# Patient Record
Sex: FEMALE | Race: WHITE | NOT HISPANIC OR LATINO | Employment: UNEMPLOYED | URBAN - METROPOLITAN AREA
[De-identification: names, ages, dates, MRNs, and addresses within clinical notes are randomized per-mention and may not be internally consistent; named-entity substitution may affect disease eponyms.]

---

## 2018-02-08 ENCOUNTER — OFFICE VISIT (OUTPATIENT)
Dept: FAMILY MEDICINE CLINIC | Facility: CLINIC | Age: 43
End: 2018-02-08

## 2018-02-08 VITALS
HEART RATE: 79 BPM | WEIGHT: 177.9 LBS | BODY MASS INDEX: 29.64 KG/M2 | DIASTOLIC BLOOD PRESSURE: 68 MMHG | SYSTOLIC BLOOD PRESSURE: 110 MMHG | TEMPERATURE: 98.2 F | OXYGEN SATURATION: 97 % | RESPIRATION RATE: 18 BRPM | HEIGHT: 65 IN

## 2018-02-08 DIAGNOSIS — Z00.00 WELL ADULT EXAM: Primary | ICD-10-CM

## 2018-02-08 PROCEDURE — 99396 PREV VISIT EST AGE 40-64: CPT | Performed by: INTERNAL MEDICINE

## 2018-02-08 RX ORDER — NORETHINDRONE ACETATE AND ETHINYL ESTRADIOL, ETHINYL ESTRADIOL AND FERROUS FUMARATE 1MG-10(24)
KIT ORAL
COMMUNITY
Start: 2018-01-07 | End: 2018-04-11

## 2018-02-08 NOTE — PROGRESS NOTES
Subjective   Sherri Espinoza is a 42 y.o. female who comes in today for   Chief Complaint   Patient presents with   • Annual Exam     NO PAP    .    History of Present Illness   Here for CPE  Without pap smear.  Sees gyn yearly and last visit was oct 2017.    The following portions of the patient's history were reviewed and updated as appropriate: allergies, current medications, past family history, past medical history, past social history, past surgical history and problem list.    Review of Systems   Constitutional: Negative.    HENT: Negative.    Respiratory: Negative.    Cardiovascular: Negative.    Gastrointestinal: Negative.    Genitourinary:        Has had break through bleeding with different birth control but have been more regular with the lo loestrin   Musculoskeletal: Positive for arthralgias (left knee pain off and on with certain activities.  no swelling or redness.  no limitations in movement or activites).   Neurological: Negative.    Psychiatric/Behavioral: Negative.        Vitals:    02/08/18 1110   BP: 110/68   Pulse: 79   Resp: 18   Temp: 98.2 °F (36.8 °C)   SpO2: 97%       Objective   Physical Exam   Constitutional: She is oriented to person, place, and time. She appears well-developed and well-nourished.   HENT:   Head: Normocephalic and atraumatic.   Right Ear: External ear normal.   Left Ear: External ear normal.   Mouth/Throat: Oropharynx is clear and moist.   Eyes: Conjunctivae and EOM are normal. Pupils are equal, round, and reactive to light.   Neck: Neck supple. No thyromegaly present.   Cardiovascular: Normal rate, regular rhythm and normal heart sounds.    Pulmonary/Chest: Effort normal and breath sounds normal.   Abdominal: Soft. Bowel sounds are normal. She exhibits no distension and no mass. There is no tenderness.   Lymphadenopathy:     She has no cervical adenopathy.   Neurological: She is alert and oriented to person, place, and time.   Skin: Skin is warm.   Psychiatric: She has  a normal mood and affect. Her behavior is normal. Judgment and thought content normal.   Nursing note and vitals reviewed.      Assessment/Plan   Sherri was seen today for annual exam.    Diagnoses and all orders for this visit:    Well adult exam  -     CBC & Differential  -     Comprehensive Metabolic Panel  -     Lipid Panel With LDL / HDL Ratio  -     T3, Free  -     T4, Free  -     TSH  -     Urinalysis With / Culture If Indicated - Urine, Clean Catch  -     Vitamin D 25 Hydroxy    we discussed quad strengthening exercises and knee preservation   Labs today  All HM is UTD  ROS neg  CXR and EKG were normal in 2016 and pt chose not to repeat today               I have asked for the patient to return to clinic in 12month(s).

## 2018-02-10 LAB
25(OH)D3+25(OH)D2 SERPL-MCNC: 37.7 NG/ML (ref 30–100)
ALBUMIN SERPL-MCNC: 4.2 G/DL (ref 3.5–5.2)
ALBUMIN/GLOB SERPL: 2.2 G/DL
ALP SERPL-CCNC: 41 U/L (ref 39–117)
ALT SERPL-CCNC: 15 U/L (ref 1–33)
APPEARANCE UR: CLEAR
AST SERPL-CCNC: 15 U/L (ref 1–32)
BACTERIA #/AREA URNS HPF: NORMAL /HPF
BASOPHILS # BLD AUTO: 0.01 10*3/MM3 (ref 0–0.2)
BASOPHILS NFR BLD AUTO: 0.1 % (ref 0–1.5)
BILIRUB SERPL-MCNC: 0.3 MG/DL (ref 0.1–1.2)
BILIRUB UR QL STRIP: NEGATIVE
BUN SERPL-MCNC: 12 MG/DL (ref 6–20)
BUN/CREAT SERPL: 13.2 (ref 7–25)
CALCIUM SERPL-MCNC: 9.2 MG/DL (ref 8.6–10.5)
CHLORIDE SERPL-SCNC: 103 MMOL/L (ref 98–107)
CHOLEST SERPL-MCNC: 158 MG/DL (ref 0–200)
CO2 SERPL-SCNC: 23.4 MMOL/L (ref 22–29)
COLOR UR: YELLOW
CREAT SERPL-MCNC: 0.91 MG/DL (ref 0.57–1)
EOSINOPHIL # BLD AUTO: 0.16 10*3/MM3 (ref 0–0.7)
EOSINOPHIL NFR BLD AUTO: 2.4 % (ref 0.3–6.2)
EPI CELLS #/AREA URNS HPF: NORMAL /HPF
ERYTHROCYTE [DISTWIDTH] IN BLOOD BY AUTOMATED COUNT: 13.6 % (ref 11.7–13)
GFR SERPLBLD CREATININE-BSD FMLA CKD-EPI: 67 ML/MIN/1.73
GFR SERPLBLD CREATININE-BSD FMLA CKD-EPI: 82 ML/MIN/1.73
GLOBULIN SER CALC-MCNC: 1.9 GM/DL
GLUCOSE SERPL-MCNC: 75 MG/DL (ref 65–99)
GLUCOSE UR QL: NEGATIVE
HCT VFR BLD AUTO: 40.8 % (ref 35.6–45.5)
HDLC SERPL-MCNC: 52 MG/DL (ref 40–60)
HGB BLD-MCNC: 13.4 G/DL (ref 11.9–15.5)
HGB UR QL STRIP: NEGATIVE
IMM GRANULOCYTES # BLD: 0 10*3/MM3 (ref 0–0.03)
IMM GRANULOCYTES NFR BLD: 0 % (ref 0–0.5)
KETONES UR QL STRIP: NEGATIVE
LDLC SERPL CALC-MCNC: 81 MG/DL (ref 0–100)
LDLC/HDLC SERPL: 1.55 {RATIO}
LEUKOCYTE ESTERASE UR QL STRIP: NEGATIVE
LYMPHOCYTES # BLD AUTO: 1.65 10*3/MM3 (ref 0.9–4.8)
LYMPHOCYTES NFR BLD AUTO: 24.5 % (ref 19.6–45.3)
MCH RBC QN AUTO: 31.3 PG (ref 26.9–32)
MCHC RBC AUTO-ENTMCNC: 32.8 G/DL (ref 32.4–36.3)
MCV RBC AUTO: 95.3 FL (ref 80.5–98.2)
MICRO URNS: NORMAL
MICRO URNS: NORMAL
MONOCYTES # BLD AUTO: 0.43 10*3/MM3 (ref 0.2–1.2)
MONOCYTES NFR BLD AUTO: 6.4 % (ref 5–12)
MUCOUS THREADS URNS QL MICRO: PRESENT /HPF
NEUTROPHILS # BLD AUTO: 4.49 10*3/MM3 (ref 1.9–8.1)
NEUTROPHILS NFR BLD AUTO: 66.6 % (ref 42.7–76)
NITRITE UR QL STRIP: NEGATIVE
PH UR STRIP: 5 [PH] (ref 5–7.5)
PLATELET # BLD AUTO: 205 10*3/MM3 (ref 140–500)
POTASSIUM SERPL-SCNC: 4.1 MMOL/L (ref 3.5–5.2)
PROT SERPL-MCNC: 6.1 G/DL (ref 6–8.5)
PROT UR QL STRIP: NEGATIVE
RBC # BLD AUTO: 4.28 10*6/MM3 (ref 3.9–5.2)
RBC #/AREA URNS HPF: NORMAL /HPF
SODIUM SERPL-SCNC: 139 MMOL/L (ref 136–145)
SP GR UR: 1.02 (ref 1–1.03)
T3FREE SERPL-MCNC: 3.3 PG/ML (ref 2–4.4)
T4 FREE SERPL-MCNC: 0.96 NG/DL (ref 0.93–1.7)
TRIGL SERPL-MCNC: 126 MG/DL (ref 0–150)
TSH SERPL DL<=0.005 MIU/L-ACNC: 1.35 MIU/ML (ref 0.27–4.2)
URINALYSIS REFLEX: NORMAL
UROBILINOGEN UR STRIP-MCNC: 0.2 MG/DL (ref 0.2–1)
VLDLC SERPL CALC-MCNC: 25.2 MG/DL (ref 5–40)
WBC # BLD AUTO: 6.74 10*3/MM3 (ref 4.5–10.7)
WBC #/AREA URNS HPF: NORMAL /HPF

## 2018-03-08 RX ORDER — ALBUTEROL SULFATE 90 UG/1
2 AEROSOL, METERED RESPIRATORY (INHALATION) EVERY 4 HOURS PRN
Qty: 18 G | Refills: 0 | Status: SHIPPED | OUTPATIENT
Start: 2018-03-08 | End: 2018-04-11

## 2018-03-22 ENCOUNTER — OFFICE VISIT (OUTPATIENT)
Dept: FAMILY MEDICINE CLINIC | Facility: CLINIC | Age: 43
End: 2018-03-22

## 2018-03-22 VITALS
HEIGHT: 65 IN | DIASTOLIC BLOOD PRESSURE: 66 MMHG | WEIGHT: 177.4 LBS | TEMPERATURE: 98.2 F | RESPIRATION RATE: 18 BRPM | SYSTOLIC BLOOD PRESSURE: 112 MMHG | BODY MASS INDEX: 29.56 KG/M2 | OXYGEN SATURATION: 99 % | HEART RATE: 77 BPM

## 2018-03-22 DIAGNOSIS — R10.9 ABDOMINAL PAIN, UNSPECIFIED ABDOMINAL LOCATION: Primary | ICD-10-CM

## 2018-03-22 DIAGNOSIS — R06.00 DYSPNEA, UNSPECIFIED TYPE: ICD-10-CM

## 2018-03-22 LAB
B-HCG UR QL: NEGATIVE
BILIRUB BLD-MCNC: NEGATIVE MG/DL
CLARITY, POC: CLEAR
COLOR UR: YELLOW
GLUCOSE UR STRIP-MCNC: NEGATIVE MG/DL
INTERNAL NEGATIVE CONTROL: NEGATIVE
INTERNAL POSITIVE CONTROL: POSITIVE
KETONES UR QL: NEGATIVE
LEUKOCYTE EST, POC: ABNORMAL
Lab: NORMAL
NITRITE UR-MCNC: NEGATIVE MG/ML
PH UR: 6 [PH] (ref 5–8)
PROT UR STRIP-MCNC: NEGATIVE MG/DL
RBC # UR STRIP: ABNORMAL /UL
SP GR UR: 1.01 (ref 1–1.03)
UROBILINOGEN UR QL: NORMAL

## 2018-03-22 PROCEDURE — 71046 X-RAY EXAM CHEST 2 VIEWS: CPT | Performed by: INTERNAL MEDICINE

## 2018-03-22 PROCEDURE — 81003 URINALYSIS AUTO W/O SCOPE: CPT | Performed by: INTERNAL MEDICINE

## 2018-03-22 PROCEDURE — 81025 URINE PREGNANCY TEST: CPT | Performed by: INTERNAL MEDICINE

## 2018-03-22 PROCEDURE — 99214 OFFICE O/P EST MOD 30 MIN: CPT | Performed by: INTERNAL MEDICINE

## 2018-03-22 RX ORDER — CIPROFLOXACIN 250 MG/1
250 TABLET, FILM COATED ORAL 2 TIMES DAILY
Qty: 14 TABLET | Refills: 0 | Status: SHIPPED | OUTPATIENT
Start: 2018-03-22 | End: 2018-04-11

## 2018-03-22 NOTE — PROGRESS NOTES
Subjective   Sherri Espinoza is a 42 y.o. female.     Shortness of Breath   This is a new problem. The current episode started 1 to 4 weeks ago. The problem occurs daily. The problem has been waxing and waning. The average episode lasts 2 hours. Associated symptoms include abdominal pain, orthopnea and wheezing (when goes up and down steps). Pertinent negatives include no vomiting. The symptoms are aggravated by exercise and any activity. Risk factors include oral contraceptive. She has tried beta agonist inhalers for the symptoms. The treatment provided no relief. There is no history of asthma, DVT or a recent surgery.   Abdominal Pain   This is a new problem. The current episode started in the past 7 days. The onset quality is sudden. The problem occurs constantly. The problem has been waxing and waning. The pain is located in the LLQ and RLQ. The pain is at a severity of 3/10. The quality of the pain is aching. The abdominal pain radiates to the back. Pertinent negatives include no constipation, diarrhea, nausea or vomiting. Nothing aggravates the pain. The pain is relieved by nothing. She has tried acetaminophen for the symptoms.        The following portions of the patient's history were reviewed and updated as appropriate: allergies, current medications, past family history, past medical history, past social history, past surgical history and problem list.    Review of Systems   Respiratory: Positive for shortness of breath and wheezing (when goes up and down steps).    Cardiovascular: Positive for orthopnea.   Gastrointestinal: Positive for abdominal pain. Negative for constipation, diarrhea, nausea and vomiting.   Genitourinary: Positive for pelvic pain. Negative for difficulty urinating, menstrual problem, vaginal discharge and vaginal pain.   Psychiatric/Behavioral: Negative.        Objective   Physical Exam   Constitutional: She is oriented to person, place, and time. She appears well-developed and  well-nourished.   HENT:   Head: Normocephalic and atraumatic.   Right Ear: External ear normal.   Left Ear: External ear normal.   Mouth/Throat: Oropharynx is clear and moist.   Eyes: Conjunctivae are normal.   Neck: Neck supple.   Cardiovascular: Normal rate, regular rhythm and normal heart sounds.    No bruits   Pulmonary/Chest: Effort normal and breath sounds normal. No respiratory distress. She has no wheezes. She has no rales.   Abdominal: Soft. Bowel sounds are normal. She exhibits no distension and no mass. There is no tenderness.   Lymphadenopathy:     She has no cervical adenopathy.   Neurological: She is alert and oriented to person, place, and time.   Skin: Skin is warm.   Psychiatric: She has a normal mood and affect. Her behavior is normal. Judgment and thought content normal.   Nursing note and vitals reviewed.      Assessment/Plan   Sherri was seen today for shortness of breath and abdominal pain.    Diagnoses and all orders for this visit:    Abdominal pain, unspecified abdominal location  -     POC Urinalysis Dipstick, Automated  -     POC Pregnancy, Urine  -     XR Chest PA & Lateral (In Office)  -     Cancel: CT Chest With Contrast; Future  -     CT angiogram chest w contrast; Future    Dyspnea, unspecified type  -     POC Urinalysis Dipstick, Automated  -     POC Pregnancy, Urine  -     XR Chest PA & Lateral (In Office)  -     Cancel: CT Chest With Contrast; Future  -     CT angiogram chest w contrast; Future    Other orders  -     ciprofloxacin (CIPRO) 250 MG tablet; Take 1 tablet by mouth 2 (Two) Times a Day.    check pregnancy test and ua   Get CXR.  Send for overread.  Appears to have increased lung markings.  Will also get CTA chest to rule out PE (on BCP)  Send ua c/s and start cipro for what may be early UTI based on dip urinalysis

## 2018-03-22 NOTE — PROGRESS NOTES
Answers for HPI/ROS submitted by the patient on 3/20/2018   Shortness of breath  Chronicity: new  Onset: 1 to 4 weeks ago  Frequency: daily  Progression since onset: waxing and waning  Episode duration: 2 hours  orthopnea: Yes  wheezing: Yes  Aggravating factors: exercise, any activity

## 2018-03-23 PROCEDURE — 93000 ELECTROCARDIOGRAM COMPLETE: CPT | Performed by: INTERNAL MEDICINE

## 2018-03-23 NOTE — PROGRESS NOTES
Procedure     ECG 12 Lead  Date/Time: 3/23/2018 8:16 AM  Performed by: SENA WILKINSON  Authorized by: SENA WILKINSON   Comparison: compared with previous ECG   Similar to previous ECG  Rhythm: sinus rhythm  Rate: normal  Conduction: conduction normal  ST Segments: ST segments normal  T Waves: T waves normal  QRS axis: normal  Clinical impression: normal ECG             Answers for HPI/ROS submitted by the patient on 3/20/2018   Shortness of breath  Chronicity: new  Onset: 1 to 4 weeks ago  Frequency: daily  Progression since onset: waxing and waning  Episode duration: 2 hours  orthopnea: Yes  wheezing: Yes  Aggravating factors: exercise, any activity

## 2018-03-24 LAB
APPEARANCE UR: CLEAR
BACTERIA #/AREA URNS HPF: NORMAL /HPF
BACTERIA UR CULT: NORMAL
BACTERIA UR CULT: NORMAL
BILIRUB UR QL STRIP: NEGATIVE
COLOR UR: YELLOW
EPI CELLS #/AREA URNS HPF: NORMAL /HPF
GLUCOSE UR QL: NEGATIVE
HGB UR QL STRIP: NEGATIVE
KETONES UR QL STRIP: NEGATIVE
LEUKOCYTE ESTERASE UR QL STRIP: ABNORMAL
MICRO URNS: ABNORMAL
NITRITE UR QL STRIP: NEGATIVE
PH UR STRIP: 6.5 [PH] (ref 5–7.5)
PROT UR QL STRIP: NEGATIVE
RBC #/AREA URNS HPF: NORMAL /HPF
SP GR UR: 1.01 (ref 1–1.03)
URINALYSIS REFLEX: ABNORMAL
UROBILINOGEN UR STRIP-MCNC: 0.2 MG/DL (ref 0.2–1)
WBC #/AREA URNS HPF: NORMAL /HPF

## 2018-03-27 DIAGNOSIS — I26.99 RIGHT PULMONARY EMBOLUS (HCC): Primary | ICD-10-CM

## 2018-03-27 DIAGNOSIS — I26.99 PULMONARY EMBOLISM ON RIGHT (HCC): Primary | ICD-10-CM

## 2018-03-28 ENCOUNTER — HOSPITAL ENCOUNTER (OUTPATIENT)
Dept: CARDIOLOGY | Facility: HOSPITAL | Age: 43
Discharge: HOME | End: 2018-03-28
Admitting: INTERNAL MEDICINE

## 2018-03-28 DIAGNOSIS — I26.99 RIGHT PULMONARY EMBOLUS (HCC): ICD-10-CM

## 2018-03-28 LAB
BH CV LOWER VASCULAR LEFT COMMON FEMORAL AUGMENT: NORMAL
BH CV LOWER VASCULAR LEFT COMMON FEMORAL COMPETENT: NORMAL
BH CV LOWER VASCULAR LEFT COMMON FEMORAL COMPRESS: NORMAL
BH CV LOWER VASCULAR LEFT COMMON FEMORAL PHASIC: NORMAL
BH CV LOWER VASCULAR LEFT COMMON FEMORAL SPONT: NORMAL
BH CV LOWER VASCULAR LEFT DISTAL FEMORAL COMPRESS: NORMAL
BH CV LOWER VASCULAR LEFT GASTRONEMIUS COMPRESS: NORMAL
BH CV LOWER VASCULAR LEFT GREATER SAPH AK COMPRESS: NORMAL
BH CV LOWER VASCULAR LEFT GREATER SAPH BK COMPRESS: NORMAL
BH CV LOWER VASCULAR LEFT MID FEMORAL AUGMENT: NORMAL
BH CV LOWER VASCULAR LEFT MID FEMORAL COMPETENT: NORMAL
BH CV LOWER VASCULAR LEFT MID FEMORAL COMPRESS: NORMAL
BH CV LOWER VASCULAR LEFT MID FEMORAL PHASIC: NORMAL
BH CV LOWER VASCULAR LEFT MID FEMORAL SPONT: NORMAL
BH CV LOWER VASCULAR LEFT PERONEAL COMPRESS: NORMAL
BH CV LOWER VASCULAR LEFT POPLITEAL AUGMENT: NORMAL
BH CV LOWER VASCULAR LEFT POPLITEAL COMPETENT: NORMAL
BH CV LOWER VASCULAR LEFT POPLITEAL COMPRESS: NORMAL
BH CV LOWER VASCULAR LEFT POPLITEAL PHASIC: NORMAL
BH CV LOWER VASCULAR LEFT POPLITEAL SPONT: NORMAL
BH CV LOWER VASCULAR LEFT POSTERIOR TIBIAL COMPRESS: NORMAL
BH CV LOWER VASCULAR LEFT PROXIMAL FEMORAL COMPRESS: NORMAL
BH CV LOWER VASCULAR LEFT SAPHENOFEMORAL JUNCTION AUGMENT: NORMAL
BH CV LOWER VASCULAR LEFT SAPHENOFEMORAL JUNCTION COMPETENT: NORMAL
BH CV LOWER VASCULAR LEFT SAPHENOFEMORAL JUNCTION COMPRESS: NORMAL
BH CV LOWER VASCULAR LEFT SAPHENOFEMORAL JUNCTION PHASIC: NORMAL
BH CV LOWER VASCULAR LEFT SAPHENOFEMORAL JUNCTION SPONT: NORMAL
BH CV LOWER VASCULAR RIGHT COMMON FEMORAL AUGMENT: NORMAL
BH CV LOWER VASCULAR RIGHT COMMON FEMORAL COMPETENT: NORMAL
BH CV LOWER VASCULAR RIGHT COMMON FEMORAL COMPRESS: NORMAL
BH CV LOWER VASCULAR RIGHT COMMON FEMORAL PHASIC: NORMAL
BH CV LOWER VASCULAR RIGHT COMMON FEMORAL SPONT: NORMAL
BH CV LOWER VASCULAR RIGHT DISTAL FEMORAL COMPRESS: NORMAL
BH CV LOWER VASCULAR RIGHT GASTRONEMIUS COMPRESS: NORMAL
BH CV LOWER VASCULAR RIGHT GREATER SAPH AK COMPRESS: NORMAL
BH CV LOWER VASCULAR RIGHT GREATER SAPH BK COMPRESS: NORMAL
BH CV LOWER VASCULAR RIGHT MID FEMORAL AUGMENT: NORMAL
BH CV LOWER VASCULAR RIGHT MID FEMORAL COMPETENT: NORMAL
BH CV LOWER VASCULAR RIGHT MID FEMORAL COMPRESS: NORMAL
BH CV LOWER VASCULAR RIGHT MID FEMORAL PHASIC: NORMAL
BH CV LOWER VASCULAR RIGHT MID FEMORAL SPONT: NORMAL
BH CV LOWER VASCULAR RIGHT PERONEAL COMPRESS: NORMAL
BH CV LOWER VASCULAR RIGHT POPLITEAL AUGMENT: NORMAL
BH CV LOWER VASCULAR RIGHT POPLITEAL COMPETENT: NORMAL
BH CV LOWER VASCULAR RIGHT POPLITEAL COMPRESS: NORMAL
BH CV LOWER VASCULAR RIGHT POPLITEAL PHASIC: NORMAL
BH CV LOWER VASCULAR RIGHT POPLITEAL SPONT: NORMAL
BH CV LOWER VASCULAR RIGHT POSTERIOR TIBIAL COMPRESS: NORMAL
BH CV LOWER VASCULAR RIGHT PROXIMAL FEMORAL COMPRESS: NORMAL
BH CV LOWER VASCULAR RIGHT SAPHENOFEMORAL JUNCTION AUGMENT: NORMAL
BH CV LOWER VASCULAR RIGHT SAPHENOFEMORAL JUNCTION COMPETENT: NORMAL
BH CV LOWER VASCULAR RIGHT SAPHENOFEMORAL JUNCTION COMPRESS: NORMAL
BH CV LOWER VASCULAR RIGHT SAPHENOFEMORAL JUNCTION PHASIC: NORMAL
BH CV LOWER VASCULAR RIGHT SAPHENOFEMORAL JUNCTION SPONT: NORMAL

## 2018-03-28 PROCEDURE — 93970 EXTREMITY STUDY: CPT

## 2018-03-29 DIAGNOSIS — I26.99 OTHER ACUTE PULMONARY EMBOLISM WITHOUT ACUTE COR PULMONALE (HCC): Primary | ICD-10-CM

## 2018-03-29 DIAGNOSIS — R10.9 RIGHT SIDED ABDOMINAL PAIN: ICD-10-CM

## 2018-03-29 DIAGNOSIS — T38.4X5A ORAL CONTRACEPTIVE CAUSING ADVERSE EFFECT IN THERAPEUTIC USE, INITIAL ENCOUNTER: ICD-10-CM

## 2018-03-29 DIAGNOSIS — R06.00 DYSPNEA, UNSPECIFIED TYPE: ICD-10-CM

## 2018-03-29 DIAGNOSIS — R10.30 LOWER ABDOMINAL PAIN: Primary | ICD-10-CM

## 2018-04-02 ENCOUNTER — HOSPITAL ENCOUNTER (OUTPATIENT)
Dept: CT IMAGING | Facility: HOSPITAL | Age: 43
Discharge: HOME | End: 2018-04-02

## 2018-04-02 ENCOUNTER — HOSPITAL ENCOUNTER (OUTPATIENT)
Dept: CARDIOLOGY | Facility: HOSPITAL | Age: 43
Discharge: HOME | End: 2018-04-02
Admitting: INTERNAL MEDICINE

## 2018-04-02 DIAGNOSIS — T38.4X5A ORAL CONTRACEPTIVE CAUSING ADVERSE EFFECT IN THERAPEUTIC USE, INITIAL ENCOUNTER: ICD-10-CM

## 2018-04-02 DIAGNOSIS — R06.00 DYSPNEA, UNSPECIFIED TYPE: ICD-10-CM

## 2018-04-02 DIAGNOSIS — R10.30 LOWER ABDOMINAL PAIN: ICD-10-CM

## 2018-04-02 DIAGNOSIS — I26.99 OTHER ACUTE PULMONARY EMBOLISM WITHOUT ACUTE COR PULMONALE (HCC): ICD-10-CM

## 2018-04-02 DIAGNOSIS — R10.9 RIGHT SIDED ABDOMINAL PAIN: ICD-10-CM

## 2018-04-02 LAB
BH CV UPPER VENOUS LEFT AXILLARY AUGMENT: NORMAL
BH CV UPPER VENOUS LEFT AXILLARY COMPETENT: NORMAL
BH CV UPPER VENOUS LEFT AXILLARY COMPRESS: NORMAL
BH CV UPPER VENOUS LEFT AXILLARY PHASIC: NORMAL
BH CV UPPER VENOUS LEFT AXILLARY SPONT: NORMAL
BH CV UPPER VENOUS LEFT BASILIC FOREARM COMPRESS: NORMAL
BH CV UPPER VENOUS LEFT BASILIC UPPER COMPRESS: NORMAL
BH CV UPPER VENOUS LEFT BRACHIAL COMPRESS: NORMAL
BH CV UPPER VENOUS LEFT CEPHALIC FOREARM COMPRESS: NORMAL
BH CV UPPER VENOUS LEFT CEPHALIC UPPER COMPRESS: NORMAL
BH CV UPPER VENOUS LEFT INTERNAL JUGULAR AUGMENT: NORMAL
BH CV UPPER VENOUS LEFT INTERNAL JUGULAR COMPETENT: NORMAL
BH CV UPPER VENOUS LEFT INTERNAL JUGULAR COMPRESS: NORMAL
BH CV UPPER VENOUS LEFT INTERNAL JUGULAR PHASIC: NORMAL
BH CV UPPER VENOUS LEFT INTERNAL JUGULAR SPONT: NORMAL
BH CV UPPER VENOUS LEFT RADIAL COMPRESS: NORMAL
BH CV UPPER VENOUS LEFT SUBCLAVIAN AUGMENT: NORMAL
BH CV UPPER VENOUS LEFT SUBCLAVIAN COMPETENT: NORMAL
BH CV UPPER VENOUS LEFT SUBCLAVIAN COMPRESS: NORMAL
BH CV UPPER VENOUS LEFT SUBCLAVIAN PHASIC: NORMAL
BH CV UPPER VENOUS LEFT SUBCLAVIAN SPONT: NORMAL
BH CV UPPER VENOUS LEFT ULNAR COMPRESS: NORMAL
BH CV UPPER VENOUS RIGHT AXILLARY AUGMENT: NORMAL
BH CV UPPER VENOUS RIGHT AXILLARY COMPETENT: NORMAL
BH CV UPPER VENOUS RIGHT AXILLARY COMPRESS: NORMAL
BH CV UPPER VENOUS RIGHT AXILLARY PHASIC: NORMAL
BH CV UPPER VENOUS RIGHT AXILLARY SPONT: NORMAL
BH CV UPPER VENOUS RIGHT BASILIC FOREARM COMPRESS: NORMAL
BH CV UPPER VENOUS RIGHT BASILIC UPPER COMPRESS: NORMAL
BH CV UPPER VENOUS RIGHT BRACHIAL COMPRESS: NORMAL
BH CV UPPER VENOUS RIGHT CEPHALIC FOREARM COMPRESS: NORMAL
BH CV UPPER VENOUS RIGHT CEPHALIC UPPER COMPRESS: NORMAL
BH CV UPPER VENOUS RIGHT INTERNAL JUGULAR AUGMENT: NORMAL
BH CV UPPER VENOUS RIGHT INTERNAL JUGULAR COMPETENT: NORMAL
BH CV UPPER VENOUS RIGHT INTERNAL JUGULAR COMPRESS: NORMAL
BH CV UPPER VENOUS RIGHT INTERNAL JUGULAR PHASIC: NORMAL
BH CV UPPER VENOUS RIGHT INTERNAL JUGULAR SPONT: NORMAL
BH CV UPPER VENOUS RIGHT RADIAL COMPRESS: NORMAL
BH CV UPPER VENOUS RIGHT SUBCLAVIAN AUGMENT: NORMAL
BH CV UPPER VENOUS RIGHT SUBCLAVIAN COMPETENT: NORMAL
BH CV UPPER VENOUS RIGHT SUBCLAVIAN COMPRESS: NORMAL
BH CV UPPER VENOUS RIGHT SUBCLAVIAN PHASIC: NORMAL
BH CV UPPER VENOUS RIGHT SUBCLAVIAN SPONT: NORMAL
BH CV UPPER VENOUS RIGHT ULNAR COMPRESS: NORMAL

## 2018-04-02 PROCEDURE — 25010000002 IOPAMIDOL 61 % SOLUTION: Performed by: INTERNAL MEDICINE

## 2018-04-02 PROCEDURE — 74177 CT ABD & PELVIS W/CONTRAST: CPT

## 2018-04-02 PROCEDURE — 93970 EXTREMITY STUDY: CPT

## 2018-04-02 PROCEDURE — 0 DIATRIZOATE MEGLUMINE & SODIUM PER 1 ML: Performed by: INTERNAL MEDICINE

## 2018-04-02 RX ADMIN — DIATRIZOATE MEGLUMINE AND DIATRIZOATE SODIUM 30 ML: 660; 100 LIQUID ORAL; RECTAL at 13:33

## 2018-04-02 RX ADMIN — IOPAMIDOL 85 ML: 612 INJECTION, SOLUTION INTRAVENOUS at 13:33

## 2018-04-06 ENCOUNTER — TELEPHONE (OUTPATIENT)
Dept: FAMILY MEDICINE CLINIC | Facility: CLINIC | Age: 43
End: 2018-04-06

## 2018-04-11 ENCOUNTER — CONSULT (OUTPATIENT)
Dept: ONCOLOGY | Facility: CLINIC | Age: 43
End: 2018-04-11

## 2018-04-11 ENCOUNTER — LAB (OUTPATIENT)
Dept: LAB | Facility: HOSPITAL | Age: 43
End: 2018-04-11

## 2018-04-11 VITALS
RESPIRATION RATE: 16 BRPM | WEIGHT: 176.2 LBS | BODY MASS INDEX: 29.36 KG/M2 | TEMPERATURE: 98.8 F | HEIGHT: 65 IN | SYSTOLIC BLOOD PRESSURE: 104 MMHG | DIASTOLIC BLOOD PRESSURE: 66 MMHG | HEART RATE: 69 BPM | OXYGEN SATURATION: 100 %

## 2018-04-11 DIAGNOSIS — I26.99 OTHER ACUTE PULMONARY EMBOLISM WITHOUT ACUTE COR PULMONALE (HCC): Primary | ICD-10-CM

## 2018-04-11 DIAGNOSIS — R06.00 DYSPNEA, UNSPECIFIED TYPE: Primary | ICD-10-CM

## 2018-04-11 LAB
BASOPHILS # BLD AUTO: 0.04 10*3/MM3 (ref 0–0.1)
BASOPHILS NFR BLD AUTO: 0.7 % (ref 0–1.1)
DEPRECATED RDW RBC AUTO: 41.2 FL (ref 37–49)
EOSINOPHIL # BLD AUTO: 0.11 10*3/MM3 (ref 0–0.36)
EOSINOPHIL NFR BLD AUTO: 1.8 % (ref 1–5)
ERYTHROCYTE [DISTWIDTH] IN BLOOD BY AUTOMATED COUNT: 12.7 % (ref 11.7–14.5)
F5 GENE MUT ANL BLD/T: NORMAL
FACTOR II, DNA ANALYSIS: NORMAL
HCT VFR BLD AUTO: 38.3 % (ref 34–45)
HGB BLD-MCNC: 13.6 G/DL (ref 11.5–14.9)
IMM GRANULOCYTES # BLD: 0.03 10*3/MM3 (ref 0–0.03)
IMM GRANULOCYTES NFR BLD: 0.5 % (ref 0–0.5)
LYMPHOCYTES # BLD AUTO: 1.5 10*3/MM3 (ref 1–3.5)
LYMPHOCYTES NFR BLD AUTO: 25.2 % (ref 20–49)
MCH RBC QN AUTO: 31.5 PG (ref 27–33)
MCHC RBC AUTO-ENTMCNC: 35.5 G/DL (ref 32–35)
MCV RBC AUTO: 88.7 FL (ref 83–97)
MONOCYTES # BLD AUTO: 0.46 10*3/MM3 (ref 0.25–0.8)
MONOCYTES NFR BLD AUTO: 7.7 % (ref 4–12)
NEUTROPHILS # BLD AUTO: 3.82 10*3/MM3 (ref 1.5–7)
NEUTROPHILS NFR BLD AUTO: 64.1 % (ref 39–75)
NRBC BLD MANUAL-RTO: 0 /100 WBC (ref 0–0)
PLATELET # BLD AUTO: 190 10*3/MM3 (ref 150–375)
PMV BLD AUTO: 10 FL (ref 8.9–12.1)
RBC # BLD AUTO: 4.32 10*6/MM3 (ref 3.9–5)
WBC NRBC COR # BLD: 5.96 10*3/MM3 (ref 4–10)

## 2018-04-11 PROCEDURE — 81241 F5 GENE: CPT | Performed by: INTERNAL MEDICINE

## 2018-04-11 PROCEDURE — 36415 COLL VENOUS BLD VENIPUNCTURE: CPT | Performed by: INTERNAL MEDICINE

## 2018-04-11 PROCEDURE — 85300 ANTITHROMBIN III ACTIVITY: CPT | Performed by: INTERNAL MEDICINE

## 2018-04-11 PROCEDURE — 85306 CLOT INHIBIT PROT S FREE: CPT | Performed by: INTERNAL MEDICINE

## 2018-04-11 PROCEDURE — 85025 COMPLETE CBC W/AUTO DIFF WBC: CPT | Performed by: INTERNAL MEDICINE

## 2018-04-11 PROCEDURE — 81240 F2 GENE: CPT | Performed by: INTERNAL MEDICINE

## 2018-04-11 PROCEDURE — 99245 OFF/OP CONSLTJ NEW/EST HI 55: CPT | Performed by: INTERNAL MEDICINE

## 2018-04-11 PROCEDURE — 85303 CLOT INHIBIT PROT C ACTIVITY: CPT | Performed by: INTERNAL MEDICINE

## 2018-04-11 NOTE — PROGRESS NOTES
Subjective     REASON FOR CONSULTATION:  Pulmonary embolism  Provide an opinion on any further workup or treatment                             REQUESTING PHYSICIAN:  Dr. Ireland    RECORDS OBTAINED:  Records of the patients history including those obtained from the referring provider were reviewed and summarized in detail.    History of Present Illness   This is a 42-year-old woman who presented to her primary care physician 3/22/18 with complaints of waxing and waning shortness of breath that had been present the previous 3-4 weeks.  Shortness of breath was worse with exertion.  She denied fever or leg swelling.  She did have associated abdominal pain in the lower quadrants 3 out of 10 also waxing and waning with no alleviating or aggravating factors.  A chest x-ray was performed which showed no acute findings.  The patient was referred for a CT angiogram at [facility] which showed a central filling defect in the right lower lobe subsegmental artery with no other filling defects seen.  Central pulmonary arteries were normal in caliber.  There was no consolidation, masses or nodules.  Bilateral breast implants were noted.  The bones were unremarkable.  The patient was started on Xarelto 3/27/18.  She has since undergone a CT of the abdomen and pelvis on 4/2/18 which showed no acute findings, no masses or lymphadenopathy.  There were no clots noted in the pelvis.  She has had Doppler ultrasound of the bilateral lower extremity which was normal and a Doppler ultrasound of the bilateral upper extremity which was normal.    Prior to the pulmonary embolism, the patient was on an oral contraceptive with estrogen which has now been discontinued.  She did not have any long distance travel or surgical procedures.  She has no known clotting disorders that run in her family or immediate family members with deep venous thromboses, although some of her biologic father's history is unknown.  She is up to date on mammography and  Pap/pelvic exams.    The patient has been tolerating Xarelto well.  She remains somewhat short of breath with exertion but improving.  She denies swelling or pain in the legs.    Past Medical History:   Diagnosis Date   • Anesthesia complication     VOMITING   • Bicornuate uterus    • Depression     mild   • Gall stones    • PONV (postoperative nausea and vomiting)         Past Surgical History:   Procedure Laterality Date   • BREAST AUGMENTATION Bilateral    •  SECTION      x2   • CHOLECYSTECTOMY     • CHOLECYSTECTOMY WITH INTRAOPERATIVE CHOLANGIOGRAM N/A 2016    Procedure: LAPAROSCOPIC CHOLECYSTECTOMY WITH INTRAOPERATIVE CHOLANGIOGRAM;  Surgeon: Nelly Jacobson MD;  Location: [facility];  Service:    • COSMETIC SURGERY     • DILATATION AND CURETTAGE  2005   • DILATATION AND CURETTAGE  2003        Current Outpatient Prescriptions on File Prior to Visit   Medication Sig Dispense Refill   • cetirizine (ZyrTEC) 10 MG tablet Take 10 mg by mouth daily.     • rivaroxaban (XARELTO) 15 MG tablet Take 1 tablet by mouth 2 (Two) Times a Day With Meals. 42 tablet 0   • [DISCONTINUED] albuterol (PROVENTIL HFA;VENTOLIN HFA) 108 (90 Base) MCG/ACT inhaler Inhale 2 puffs Every 4 (Four) Hours As Needed for Wheezing. 18 g 0   • [DISCONTINUED] buPROPion SR (WELLBUTRIN SR) 150 MG 12 hr tablet Take 150 mg by mouth daily.     • [DISCONTINUED] ciprofloxacin (CIPRO) 250 MG tablet Take 1 tablet by mouth 2 (Two) Times a Day. 14 tablet 0   • [DISCONTINUED] Glucosamine Sulfate 1000 MG tablet Take 1,000 mg by mouth daily.     • [DISCONTINUED] LO LOESTRIN FE 1 MG-10 MCG / 10 MCG tablet      • [DISCONTINUED] Multiple Vitamin (MULTIVITAMINS PO) Take 1 tablet by mouth daily.       No current facility-administered medications on file prior to visit.         ALLERGIES:  No Known Allergies     Social History     Social History   • Marital status:      Spouse name: Remy   • Number of children: 2     Occupational  "History   •  [employer]     Social History Main Topics   • Smoking status: Never Smoker   • Smokeless tobacco: Never Used   • Alcohol use Yes      Comment: Social   • Drug use: No   • Sexual activity: Defer     Other Topics Concern   • Not on file        Family History   Problem Relation Age of Onset   • No Known Problems Mother    • No Known Problems Father         Review of Systems   Constitutional: Positive for activity change. Negative for chills and unexpected weight change.   HENT: Negative.    Eyes: Negative.    Respiratory: Positive for shortness of breath and wheezing. Negative for choking and stridor.    Cardiovascular: Negative.    Gastrointestinal: Negative.    Genitourinary: Negative.    Musculoskeletal: Negative.    Skin: Negative.    Neurological: Negative.    Hematological: Negative.    Psychiatric/Behavioral: Negative.          Objective     Vitals:    04/11/18 0802   BP: 104/66   Pulse: 69   Resp: 16   Temp: 98.8 °F (37.1 °C)   TempSrc: Oral   SpO2: 100%   Weight: 79.9 kg (176 lb 3.2 oz)   Height: 166.2 cm (65.43\")   PainSc: 3  Comment: Pain in diaphram x3 weeks.   PainLoc: Abdomen     Current Status 4/11/2018   ECOG score 0       Physical Exam    CON: pleasant woman in no distress  HEENT: no icterus, no thrush  NECK: no jvd, no lad  CV: RRR, S1S2, no murmur  RESP: cta bilat, non-labored, no wheezing, no dullness  GI: soft, nontender, +bs  MS: no LE edema  SKIN: no petechiae or bruising  NEURO: alert and oriented x3, normal strength  PSYCH: normal mood    RECENT LABS:  Hematology WBC   Date Value Ref Range Status   04/11/2018 5.96 4.00 - 10.00 10*3/mm3 Final     RBC   Date Value Ref Range Status   04/11/2018 4.32 3.90 - 5.00 10*6/mm3 Final     Hemoglobin   Date Value Ref Range Status   04/11/2018 13.6 11.5 - 14.9 g/dL Final     Hematocrit   Date Value Ref Range Status   04/11/2018 38.3 34.0 - 45.0 % Final     Platelets   Date Value Ref Range Status   04/11/2018 190 150 - 375 " 10*3/mm3 Final      CT of the chest, abdomen, pelvis, upper extremity Doppler ultrasounds, lower extremity Doppler ultrasounds as reviewed in the history of present illness above.    Assessment:    This is a 42-year-old woman with a first-time incident of pulmonary embolism noted by CT scan 3/26/18 after she presented with dyspnea on exertion.  CT of the abdomen and pelvis showed no pelvic thromboses, and Doppler ultrasounds of the upper and lower extremities were negative.    Agents main risk factor for thrombosis was the fact she was on an estrogen containing oral contraceptive at the time of the pulmonary embolism.  The OCP has been discontinued.  She is discussing with her gynecologist appropriate alternative forms of birth control.    The patient has no familial history of thrombosis to her knowledge.    Recommendations:  1.  I agree with the current plan of anticoagulation with Xarelto 15 mg twice daily for 21 days followed by 20 mg daily; agree with the current plan of at least 6 months of anticoagulation for what appears to be a provoked pulmonary embolism from estrogen containing oral contraceptive  2.  I agree with discontinuation of any estrogen containing medications and I have discussed with the patient to avoid estrogen containing medications in the future.  She will discuss with her gynecologist alternative forms of birth control which would be appropriate in the setting.  3.  I will send a hypercoagulable evaluation including factor V Leiden gene mutation, prothrombin gene mutation, protein C, protein S, antithrombin III, lupus anticoagulant, beta-2-glycoprotein-1, and anticardiolipin antibody panel  4.  I will see the patient back in 2 weeks to review results

## 2018-04-12 LAB
AT III PPP CHRO-ACNC: 97 % (ref 90–134)
CARDIOLIPIN IGG SER IA-ACNC: <9 GPL U/ML (ref 0–14)
CARDIOLIPIN IGM SER IA-ACNC: 10 MPL U/ML (ref 0–12)
PROT C PPP-ACNC: 115 % (ref 86–163)
PROT S ACT/NOR PPP: 103 % (ref 70–127)
PROT S FREE PPP-ACNC: 75 % (ref 49–138)

## 2018-04-13 LAB
B2 GLYCOPROT1 IGA SER-ACNC: <9 GPI IGA UNITS (ref 0–25)
B2 GLYCOPROT1 IGG SER-ACNC: <9 GPI IGG UNITS (ref 0–20)
B2 GLYCOPROT1 IGM SER-ACNC: <9 GPI IGM UNITS (ref 0–32)
DRVVT IMM 1:2 NP PPP: 69.2 SEC (ref 0–47)
LA NT PLATELET PPP: 43.8 SEC (ref 0–51.9)
LUPUS ANTICOAGULANT REFLEX: ABNORMAL
SCREEN DRVVT: 1.5 RATIO (ref 0.8–1.2)
SCREEN DRVVT: 97.5 SEC (ref 0–47)

## 2018-04-16 ENCOUNTER — TELEPHONE (OUTPATIENT)
Dept: FAMILY MEDICINE CLINIC | Facility: CLINIC | Age: 43
End: 2018-04-16

## 2018-04-16 NOTE — TELEPHONE ENCOUNTER
Pt said that she was getting xarelto 15mg but you wanted to increase it to 20mg. She would like to know if you could send a prescription for 20mg to [facility] mail order

## 2018-04-25 ENCOUNTER — APPOINTMENT (OUTPATIENT)
Dept: LAB | Facility: HOSPITAL | Age: 43
End: 2018-04-25

## 2018-04-25 ENCOUNTER — OFFICE VISIT (OUTPATIENT)
Dept: ONCOLOGY | Facility: CLINIC | Age: 43
End: 2018-04-25

## 2018-04-25 VITALS
HEIGHT: 65 IN | HEART RATE: 72 BPM | OXYGEN SATURATION: 100 % | TEMPERATURE: 98.6 F | WEIGHT: 173.4 LBS | SYSTOLIC BLOOD PRESSURE: 102 MMHG | RESPIRATION RATE: 16 BRPM | DIASTOLIC BLOOD PRESSURE: 58 MMHG | BODY MASS INDEX: 28.89 KG/M2

## 2018-04-25 DIAGNOSIS — I26.99 OTHER ACUTE PULMONARY EMBOLISM WITHOUT ACUTE COR PULMONALE (HCC): Primary | ICD-10-CM

## 2018-04-25 PROCEDURE — G0463 HOSPITAL OUTPT CLINIC VISIT: HCPCS | Performed by: INTERNAL MEDICINE

## 2018-04-25 PROCEDURE — 99214 OFFICE O/P EST MOD 30 MIN: CPT | Performed by: INTERNAL MEDICINE

## 2018-04-25 NOTE — PROGRESS NOTES
Subjective     REASON FOR CONSULTATION:  Pulmonary embolism  Provide an opinion on any further workup or treatment                             REQUESTING PHYSICIAN:  Dr. Ireland    RECORDS OBTAINED:  Records of the patients history including those obtained from the referring provider were reviewed and summarized in detail.    History of Present Illness   Ms. Espinoza turns today for follow-up of her hypercoagulable profile results.  She is overall doing well on Xarelto with no bleeding.  She still feels mildly short of breath but improving steadily.    Hematology History:  This is a 42-year-old woman who presented to her primary care physician 3/22/18 with complaints of waxing and waning shortness of breath that had been present the previous 3-4 weeks.  Shortness of breath was worse with exertion.  She denied fever or leg swelling.  She did have associated abdominal pain in the lower quadrants 3 out of 10 also waxing and waning with no alleviating or aggravating factors.  A chest x-ray was performed which showed no acute findings.  The patient was referred for a CT angiogram at [facility] which showed a central filling defect in the right lower lobe subsegmental artery with no other filling defects seen.  Central pulmonary arteries were normal in caliber.  There was no consolidation, masses or nodules.  Bilateral breast implants were noted.  The bones were unremarkable.  The patient was started on Xarelto 3/27/18.  She has since undergone a CT of the abdomen and pelvis on 4/2/18 which showed no acute findings, no masses or lymphadenopathy.  There were no clots noted in the pelvis.  She has had Doppler ultrasound of the bilateral lower extremity which was normal and a Doppler ultrasound of the bilateral upper extremity which was normal.    Prior to the pulmonary embolism, the patient was on an oral contraceptive with estrogen which has now been discontinued.  She did not have any long distance travel or surgical  procedures.  She has no known clotting disorders that run in her family or immediate family members with deep venous thromboses, although some of her biologic father's history is unknown.  She is up to date on mammography and Pap/pelvic exams.    Hypercoagulable evaluation 18:  Antithrombin III-97%  Protein S antigen antigen-75%  Protein S functional-103%  Protein C activity-115%  Factor V Leiden-negative  Prothrombin gene mutation-negative  Anticardiolipin antibody profile-negative  Beta-2 glycoprotein 1 profile-negative  Lupus anticoagulant-positive    Past Medical History:   Diagnosis Date   • Anesthesia complication     VOMITING   • Bicornuate uterus    • Depression     mild   • Gall stones    • PONV (postoperative nausea and vomiting)         Past Surgical History:   Procedure Laterality Date   • BREAST AUGMENTATION Bilateral    •  SECTION  , 2007    x2   • CHOLECYSTECTOMY  2006   • CHOLECYSTECTOMY WITH INTRAOPERATIVE CHOLANGIOGRAM N/A 2016    Procedure: LAPAROSCOPIC CHOLECYSTECTOMY WITH INTRAOPERATIVE CHOLANGIOGRAM;  Surgeon: Nelly Jacobson MD;  Location: [facility];  Service:    • COSMETIC SURGERY     • DILATATION AND CURETTAGE  2005   • DILATATION AND CURETTAGE  2003        Current Outpatient Prescriptions on File Prior to Visit   Medication Sig Dispense Refill   • cetirizine (ZyrTEC) 10 MG tablet Take 10 mg by mouth daily.     • Prenatal Vit-Fe Fumarate-FA (PRENATAL VITAMIN PO) Take 1 tablet by mouth Daily.     • rivaroxaban (XARELTO) 20 MG tablet Take 1 tablet by mouth Daily With Dinner. 90 tablet 1     No current facility-administered medications on file prior to visit.         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   HENT: Negative.    Eyes: Negative.    Respiratory: Positive for shortness of breath. Negative for choking, wheezing and stridor.    Cardiovascular: Negative.    Gastrointestinal: Negative.    Genitourinary: Negative.    Musculoskeletal: Negative.    Skin: Negative.    Neurological: Negative.    Hematological: Negative.    Psychiatric/Behavioral: Negative.          Objective     Vitals:    04/25/18 0907   BP: 102/58   Pulse: 72   Resp: 16   Temp: 98.6 °F (37 °C)   TempSrc: Oral   SpO2: 100%   Weight: 78.7 kg (173 lb 6.4 oz)   Height: 166.2 cm (65.43\")   PainSc: 0-No pain     Current Status 4/25/2018   ECOG score 0       Physical Exam    CON: pleasant woman in no distress  HEENT: no icterus, no thrush  NECK: no jvd, no lad  CV: RRR, S1S2, no murmur  RESP: cta bilat, non-labored, no wheezing, no dullness  GI: soft, nontender, +bs  MS: no LE edema  SKIN: no petechiae or bruising  NEURO: alert and oriented x3, normal strength  PSYCH: normal mood    Physical exam performed and unchanged from above-4/25/18    RECENT LABS:  Hematology WBC   Date Value Ref Range Status   04/11/2018 5.96 4.00 - 10.00 10*3/mm3 Final     RBC   Date Value Ref Range Status   04/11/2018 4.32 3.90 - 5.00 10*6/mm3 Final     Hemoglobin   Date Value Ref Range Status   04/11/2018 13.6 11.5 - 14.9 g/dL Final     Hematocrit   Date Value Ref Range Status   04/11/2018 38.3 34.0 - 45.0 % Final     Platelets   Date Value Ref Range Status   04/11/2018 190 150 - 375 10*3/mm3 Final      CT of the chest, abdomen, pelvis, upper extremity Doppler ultrasounds, lower " extremity Doppler ultrasounds as reviewed in the history of present illness above.    Hypercoagulable evaluation 4/11/18:  Antithrombin III-97%  Protein S antigen antigen-75%  Protein S functional-103%  Protein C activity-115%  Factor V Leiden-negative  Prothrombin gene mutation-negative  Anticardiolipin antibody profile-negative  Beta-2 glycoprotein 1 profile-negative  Lupus anticoagulant-positive    Assessment:    This is a 42-year-old woman with a first-time incident of pulmonary embolism noted by CT scan 3/26/18 after she presented with dyspnea on exertion.  CT of the abdomen and pelvis showed no pelvic thromboses, and Doppler ultrasounds of the upper and lower extremities were negative.    The patient's main risk factor for thrombosis was the fact she was on an estrogen containing oral contraceptive at the time of the pulmonary embolism.  The OCP has been discontinued.  She is discussing with her gynecologist appropriate alternative forms of birth control.    The patient has no familial history of thrombosis to her knowledge.    Positive lupus anticoagulant (new)    Recommendations:  1.  Continue Xarelto 20 mg daily for at least 6 months of anticoagulation for what appears to be a provoked pulmonary embolism from estrogen containing oral contraceptive  2.  I agree with discontinuation of any estrogen containing medications and I have discussed with the patient to avoid estrogen containing medications in the future.  She will discuss with her gynecologist alternative forms of birth control which would be appropriate in the setting.  3.  The lupus anticoagulant is positive and remainder of the hyper hypercoagulable profile-negative; the lupus anticoagulant test is often falsely positive while patients are on Xarelto.  I recommended that we repeat the test 2-3 weeks after Xarelto is discontinued  4.  I recommended the patient have a repeat CT angiogram of the chest in approximately 5 months.  I will see her back  to review those results.  If the angiogram is negative at that time she can likely discontinue anticoagulation and we will repeat the lupus anticoagulant 2-3 weeks after Xarelto is discontinued.

## 2018-09-19 ENCOUNTER — HOSPITAL ENCOUNTER (OUTPATIENT)
Dept: CT IMAGING | Facility: HOSPITAL | Age: 43
Discharge: HOME | End: 2018-09-19
Attending: INTERNAL MEDICINE | Admitting: INTERNAL MEDICINE

## 2018-09-19 DIAGNOSIS — I26.99 OTHER ACUTE PULMONARY EMBOLISM WITHOUT ACUTE COR PULMONALE (HCC): ICD-10-CM

## 2018-09-19 LAB
ANION GAP SERPL CALCULATED.3IONS-SCNC: 10.7 MMOL/L
BASOPHILS # BLD AUTO: 0.01 10*3/MM3 (ref 0–0.2)
BASOPHILS NFR BLD AUTO: 0.1 % (ref 0–1.5)
BUN BLD-MCNC: 9 MG/DL (ref 6–20)
BUN/CREAT SERPL: 11.7 (ref 7–25)
CALCIUM SPEC-SCNC: 9.5 MG/DL (ref 8.6–10.5)
CHLORIDE SERPL-SCNC: 106 MMOL/L (ref 98–107)
CO2 SERPL-SCNC: 26.3 MMOL/L (ref 22–29)
CREAT BLD-MCNC: 0.77 MG/DL (ref 0.57–1)
DEPRECATED RDW RBC AUTO: 44.1 FL (ref 37–54)
EOSINOPHIL # BLD AUTO: 0.16 10*3/MM3 (ref 0–0.7)
EOSINOPHIL NFR BLD AUTO: 2.4 % (ref 0.3–6.2)
ERYTHROCYTE [DISTWIDTH] IN BLOOD BY AUTOMATED COUNT: 13.2 % (ref 11.7–13)
GFR SERPL CREATININE-BSD FRML MDRD: 81 ML/MIN/1.73
GLUCOSE BLD-MCNC: 66 MG/DL (ref 65–99)
HCT VFR BLD AUTO: 39.4 % (ref 35.6–45.5)
HGB BLD-MCNC: 13.7 G/DL (ref 11.9–15.5)
IMM GRANULOCYTES # BLD: 0.01 10*3/MM3 (ref 0–0.03)
IMM GRANULOCYTES NFR BLD: 0.1 % (ref 0–0.5)
LYMPHOCYTES # BLD AUTO: 1.49 10*3/MM3 (ref 0.9–4.8)
LYMPHOCYTES NFR BLD AUTO: 21.9 % (ref 19.6–45.3)
MCH RBC QN AUTO: 31.9 PG (ref 26.9–32)
MCHC RBC AUTO-ENTMCNC: 34.8 G/DL (ref 32.4–36.3)
MCV RBC AUTO: 91.8 FL (ref 80.5–98.2)
MONOCYTES # BLD AUTO: 0.3 10*3/MM3 (ref 0.2–1.2)
MONOCYTES NFR BLD AUTO: 4.4 % (ref 5–12)
NEUTROPHILS # BLD AUTO: 4.84 10*3/MM3 (ref 1.9–8.1)
NEUTROPHILS NFR BLD AUTO: 71.2 % (ref 42.7–76)
PLATELET # BLD AUTO: 191 10*3/MM3 (ref 140–500)
PMV BLD AUTO: 10.3 FL (ref 6–12)
POTASSIUM BLD-SCNC: 4.1 MMOL/L (ref 3.5–5.2)
RBC # BLD AUTO: 4.29 10*6/MM3 (ref 3.9–5.2)
SODIUM BLD-SCNC: 143 MMOL/L (ref 136–145)
WBC NRBC COR # BLD: 6.8 10*3/MM3 (ref 4.5–10.7)

## 2018-09-19 PROCEDURE — 0 IOPAMIDOL PER 1 ML: Performed by: INTERNAL MEDICINE

## 2018-09-19 PROCEDURE — 85025 COMPLETE CBC W/AUTO DIFF WBC: CPT | Performed by: INTERNAL MEDICINE

## 2018-09-19 PROCEDURE — 80048 BASIC METABOLIC PNL TOTAL CA: CPT | Performed by: INTERNAL MEDICINE

## 2018-09-19 PROCEDURE — 71275 CT ANGIOGRAPHY CHEST: CPT

## 2018-09-19 RX ADMIN — IOPAMIDOL 95 ML: 755 INJECTION, SOLUTION INTRAVENOUS at 09:13

## 2018-09-26 ENCOUNTER — APPOINTMENT (OUTPATIENT)
Dept: LAB | Facility: HOSPITAL | Age: 43
End: 2018-09-26

## 2018-09-26 ENCOUNTER — OFFICE VISIT (OUTPATIENT)
Dept: ONCOLOGY | Facility: CLINIC | Age: 43
End: 2018-09-26

## 2018-09-26 VITALS
SYSTOLIC BLOOD PRESSURE: 96 MMHG | BODY MASS INDEX: 28.79 KG/M2 | DIASTOLIC BLOOD PRESSURE: 68 MMHG | RESPIRATION RATE: 12 BRPM | WEIGHT: 172.8 LBS | TEMPERATURE: 99.3 F | HEART RATE: 69 BPM | OXYGEN SATURATION: 98 % | HEIGHT: 65 IN

## 2018-09-26 DIAGNOSIS — I26.99 OTHER ACUTE PULMONARY EMBOLISM WITHOUT ACUTE COR PULMONALE (HCC): Primary | ICD-10-CM

## 2018-09-26 DIAGNOSIS — R91.8 MULTIPLE LUNG NODULES ON CT: ICD-10-CM

## 2018-09-26 LAB
BASOPHILS # BLD AUTO: 0.02 10*3/MM3 (ref 0–0.1)
BASOPHILS NFR BLD AUTO: 0.3 % (ref 0–1.1)
DEPRECATED RDW RBC AUTO: 40.4 FL (ref 37–49)
EOSINOPHIL # BLD AUTO: 0.11 10*3/MM3 (ref 0–0.36)
EOSINOPHIL NFR BLD AUTO: 1.4 % (ref 1–5)
ERYTHROCYTE [DISTWIDTH] IN BLOOD BY AUTOMATED COUNT: 12.5 % (ref 11.7–14.5)
HCT VFR BLD AUTO: 39.8 % (ref 34–45)
HGB BLD-MCNC: 13.9 G/DL (ref 11.5–14.9)
IMM GRANULOCYTES # BLD: 0.03 10*3/MM3 (ref 0–0.03)
IMM GRANULOCYTES NFR BLD: 0.4 % (ref 0–0.5)
LYMPHOCYTES # BLD AUTO: 1.44 10*3/MM3 (ref 1–3.5)
LYMPHOCYTES NFR BLD AUTO: 18.8 % (ref 20–49)
MCH RBC QN AUTO: 31.1 PG (ref 27–33)
MCHC RBC AUTO-ENTMCNC: 34.9 G/DL (ref 32–35)
MCV RBC AUTO: 89 FL (ref 83–97)
MONOCYTES # BLD AUTO: 0.63 10*3/MM3 (ref 0.25–0.8)
MONOCYTES NFR BLD AUTO: 8.2 % (ref 4–12)
NEUTROPHILS # BLD AUTO: 5.43 10*3/MM3 (ref 1.5–7)
NEUTROPHILS NFR BLD AUTO: 70.9 % (ref 39–75)
NRBC BLD MANUAL-RTO: 0 /100 WBC (ref 0–0)
PLATELET # BLD AUTO: 197 10*3/MM3 (ref 150–375)
PMV BLD AUTO: 10.2 FL (ref 8.9–12.1)
RBC # BLD AUTO: 4.47 10*6/MM3 (ref 3.9–5)
WBC NRBC COR # BLD: 7.66 10*3/MM3 (ref 4–10)

## 2018-09-26 PROCEDURE — 99214 OFFICE O/P EST MOD 30 MIN: CPT | Performed by: INTERNAL MEDICINE

## 2018-09-26 PROCEDURE — 85025 COMPLETE CBC W/AUTO DIFF WBC: CPT | Performed by: INTERNAL MEDICINE

## 2018-09-26 PROCEDURE — 36416 COLLJ CAPILLARY BLOOD SPEC: CPT | Performed by: INTERNAL MEDICINE

## 2018-09-26 NOTE — PROGRESS NOTES
Subjective     REASON FOR CONSULTATION:  Pulmonary embolism  Provide an opinion on any further workup or treatment                             REQUESTING PHYSICIAN:  Dr. Ireland    RECORDS OBTAINED:  Records of the patients history including those obtained from the referring provider were reviewed and summarized in detail.    History of Present Illness   Ms. Espinoza turns today for follow-up of her hypercoagulable profile results.  She is overall doing well on Xarelto with no bleeding.  She still feels mildly short of breath but improving steadily.    Hematology History:  This is a 42-year-old woman who presented to her primary care physician 3/22/18 with complaints of waxing and waning shortness of breath that had been present the previous 3-4 weeks.  Shortness of breath was worse with exertion.  She denied fever or leg swelling.  She did have associated abdominal pain in the lower quadrants 3 out of 10 also waxing and waning with no alleviating or aggravating factors.  A chest x-ray was performed which showed no acute findings.  The patient was referred for a CT angiogram at [facility] which showed a central filling defect in the right lower lobe subsegmental artery with no other filling defects seen.  Central pulmonary arteries were normal in caliber.  There was no consolidation, masses or nodules.  Bilateral breast implants were noted.  The bones were unremarkable.  The patient was started on Xarelto 3/27/18.  She has since undergone a CT of the abdomen and pelvis on 4/2/18 which showed no acute findings, no masses or lymphadenopathy.  There were no clots noted in the pelvis.  She has had Doppler ultrasound of the bilateral lower extremity which was normal and a Doppler ultrasound of the bilateral upper extremity which was normal.    Prior to the pulmonary embolism, the patient was on an oral contraceptive with estrogen which has now been discontinued.  She did not have any long distance travel or surgical  procedures.  She has no known clotting disorders that run in her family or immediate family members with deep venous thromboses, although some of her biologic father's history is unknown.  She is up to date on mammography and Pap/pelvic exams.    Hypercoagulable evaluation 18:  Antithrombin III-97%  Protein S antigen antigen-75%  Protein S functional-103%  Protein C activity-115%  Factor V Leiden-negative  Prothrombin gene mutation-negative  Anticardiolipin antibody profile-negative  Beta-2 glycoprotein 1 profile-negative  Lupus anticoagulant-positive    The patient returned today for follow-up having completed 6 months of anticoagulation and CT scan review.  For the past 2-3 weeks she reports increasing dyspnea at rest and with exertion feeling like she cannot take a deep breath.  She denies significant cough, fever, chills, leg swelling, chest pain.    Past Medical History:   Diagnosis Date   • Anesthesia complication     VOMITING   • Bicornuate uterus    • Depression     mild   • Gall stones    • PONV (postoperative nausea and vomiting)         Past Surgical History:   Procedure Laterality Date   • BREAST AUGMENTATION Bilateral    •  SECTION  , 2007    x2   • CHOLECYSTECTOMY  2006   • CHOLECYSTECTOMY WITH INTRAOPERATIVE CHOLANGIOGRAM N/A 2016    Procedure: LAPAROSCOPIC CHOLECYSTECTOMY WITH INTRAOPERATIVE CHOLANGIOGRAM;  Surgeon: Nelly Jacobson MD;  Location: [facility];  Service:    • COSMETIC SURGERY     • DILATATION AND CURETTAGE  2005   • DILATATION AND CURETTAGE  2003        Current Outpatient Prescriptions on File Prior to Visit   Medication Sig Dispense Refill   • cetirizine (ZyrTEC) 10 MG tablet Take 10 mg by mouth daily.     • Prenatal Vit-Fe Fumarate-FA (PRENATAL VITAMIN PO) Take 1 tablet by mouth Daily.     • rivaroxaban (XARELTO) 20 MG tablet Take 1 tablet by mouth Daily With Dinner. 90 tablet 1     No current facility-administered medications on file prior  "to visit.         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   HENT: Negative.    Eyes: Negative.    Respiratory: Positive for shortness of breath. Negative for choking, wheezing and stridor.    Cardiovascular: Negative.    Gastrointestinal: Negative.    Genitourinary: Negative.    Musculoskeletal: Negative.    Skin: Negative.    Neurological: Negative.    Hematological: Negative.    Psychiatric/Behavioral: Negative.      ROS unchanged-9/26/18    Objective     Vitals:    09/26/18 0926   BP: 96/68   Pulse: 69   Resp: 12   Temp: 99.3 °F (37.4 °C)   TempSrc: Oral   SpO2: 98%   Weight: 78.4 kg (172 lb 12.8 oz)   Height: 166 cm (65.35\")  Comment: new height   PainSc: 0-No pain  Comment: no pain but SOA     Current Status 9/26/2018   ECOG score 0       Physical Exam    CON: pleasant woman in no distress  HEENT: no icterus, no thrush  NECK: no jvd, no lad  CV: RRR, S1S2, no murmur  RESP: cta bilat, non-labored, no wheezing, no dullness  GI: soft, nontender, +bs  MS: no LE edema  SKIN: no petechiae or bruising  NEURO: alert and oriented x3, normal strength  PSYCH: normal mood    Physical exam performed and unchanged from above-9/26/18    RECENT LABS:  Hematology WBC   Date Value Ref Range Status   09/26/2018 7.66 4.00 - 10.00 10*3/mm3 Final     RBC   Date Value Ref Range Status "   09/26/2018 4.47 3.90 - 5.00 10*6/mm3 Final     Hemoglobin   Date Value Ref Range Status   09/26/2018 13.9 11.5 - 14.9 g/dL Final     Hematocrit   Date Value Ref Range Status   09/26/2018 39.8 34.0 - 45.0 % Final     Platelets   Date Value Ref Range Status   09/26/2018 197 150 - 375 10*3/mm3 Final      CT angiogram 9/19/18: There is a tiny filling defect in the right upper lobe which does not appear acute.  No right heart strain.  No pulmonary infiltrates.  There are a few subcentimeter nodules in the left lung.    Hypercoagulable evaluation 4/11/18:  Antithrombin III-97%  Protein S antigen antigen-75%  Protein S functional-103%  Protein C activity-115%  Factor V Leiden-negative  Prothrombin gene mutation-negative  Anticardiolipin antibody profile-negative  Beta-2 glycoprotein 1 profile-negative  Lupus anticoagulant-positive    Assessment:    This is a 42-year-old woman with a first-time incident of pulmonary embolism noted by CT scan 3/26/18 after she presented with dyspnea on exertion.  CT of the abdomen and pelvis showed no pelvic thromboses, and Doppler ultrasounds of the upper and lower extremities were negative.  Follow-up CT angiogram 9/19/18 shows no acute thromboembolism.  There is a tiny filling defect in the right upper lobe.  I am not sure if this is the same filling defect seen at [facility] which was read as the right lower lobe?    The patient's main risk factor for thrombosis was the fact she was on an estrogen containing oral contraceptive at the time of the pulmonary embolism.  The OCP has been discontinued.  She is discussing with her gynecologist appropriate alternative forms of birth control.    The patient has no familial history of thrombosis to her knowledge.    Positive lupus anticoagulant while on Xarelto; remainder of the hypercoagulable profile was negative    Tiny left lung nodules (new):  Reviewed the previous angiogram from [facility] 3/22/18 and these were not  mentioned    Recommendations:  1.  Patient has completed 6 months of anticoagulation and I recommended she discontinue Xarelto  2.  I ordered a repeat lupus anticoagulant to be done 3-4 weeks after Xarelto is discontinued; if this were to remain positive, anticoagulation would likely need to be restarted  3.  I recommended she follow-up with her PCP for further evaluation of dyspnea which is not clearly explained by her current CT scan  4.  I ordered a repeat CT of the chest to be done in 4 months to reevaluate the mentioned tiny lung nodules in the left lung  5.  Avoid estrogen containing medications

## 2018-09-26 NOTE — PROGRESS NOTES
I spoke with the radiologist.  She did not have your images from [facility] to compare her current images therefore we aren't sure if the pulmonary nodules were there in March.  Can you get the CD from [facility] and take it to radiology at the hospital and have them compare the CT's and write an addendum?  The Radiologist was Dr. Sisi Balderrama.  If you drop off the CD and don't hear from me shortly thereafter, please call me.

## 2018-10-01 ENCOUNTER — TELEPHONE (OUTPATIENT)
Dept: FAMILY MEDICINE CLINIC | Facility: CLINIC | Age: 43
End: 2018-10-01

## 2018-10-01 NOTE — TELEPHONE ENCOUNTER
----- Message from Ladan Ireland MD sent at 9/30/2018  5:33 PM EDT -----  Dr. Witt   I had patient get her old CT from [facility] and have the radiologist at [facility] compare the current CT with the one from March and the pulmonary nodules and LN were present at that time favoring a benign process.  However, radiologist does rec f/u CT chest in a year or even in 2020 to document stability.  Sulema, my nurse, will call and let patient know the results of the comparison films.      Ladan Ireland  ----- Message -----  From: Otilia Arnett  Sent: 9/28/2018  10:16 AM  To: Ladan Ireland MD

## 2018-10-01 NOTE — TELEPHONE ENCOUNTER
Patient has been advised  She said that dr roa is having her repeat CT in 4 months and then she will repeat again in 2019 or 2020

## 2018-10-24 ENCOUNTER — LAB (OUTPATIENT)
Dept: LAB | Facility: HOSPITAL | Age: 43
End: 2018-10-24

## 2018-10-24 DIAGNOSIS — I26.99 OTHER ACUTE PULMONARY EMBOLISM WITHOUT ACUTE COR PULMONALE (HCC): ICD-10-CM

## 2018-10-24 PROCEDURE — 36415 COLL VENOUS BLD VENIPUNCTURE: CPT | Performed by: INTERNAL MEDICINE

## 2018-10-25 LAB
LA NT DPL PPP: 44.3 SEC (ref 0–55)
LA NT DPL/LA NT HPL PPP-RTO: 1.13 RATIO (ref 0–1.4)
LA NT PLATELET PPP: 30.9 SEC (ref 0–51.9)
LUPUS ANTICOAGULANT REFLEX: NORMAL
SCREEN DRVVT: 37.8 SEC (ref 0–47)
THROMBIN TIME: 18.2 SEC (ref 0–23)

## 2018-10-26 ENCOUNTER — TELEPHONE (OUTPATIENT)
Dept: ONCOLOGY | Facility: CLINIC | Age: 43
End: 2018-10-26

## 2018-10-26 NOTE — TELEPHONE ENCOUNTER
----- Message from Prasanth Witt MD sent at 10/25/2018  5:41 PM EDT -----  Please let her know repeat lupus anticoagulant test is negative so she can remain off blood thinner

## 2019-01-07 ENCOUNTER — HOSPITAL ENCOUNTER (OUTPATIENT)
Dept: PET IMAGING | Facility: HOSPITAL | Age: 44
Discharge: HOME | End: 2019-01-07
Attending: INTERNAL MEDICINE | Admitting: INTERNAL MEDICINE

## 2019-01-07 DIAGNOSIS — R91.8 MULTIPLE LUNG NODULES ON CT: ICD-10-CM

## 2019-01-07 PROCEDURE — 71260 CT THORAX DX C+: CPT

## 2019-01-07 PROCEDURE — 82565 ASSAY OF CREATININE: CPT

## 2019-01-07 PROCEDURE — 25010000002 IOPAMIDOL 61 % SOLUTION: Performed by: INTERNAL MEDICINE

## 2019-01-07 RX ADMIN — IOPAMIDOL 75 ML: 612 INJECTION, SOLUTION INTRAVENOUS at 12:47

## 2019-01-11 LAB — CREAT BLDA-MCNC: 0.8 MG/DL (ref 0.6–1.3)

## 2019-01-14 ENCOUNTER — OFFICE VISIT (OUTPATIENT)
Dept: ONCOLOGY | Facility: CLINIC | Age: 44
End: 2019-01-14

## 2019-01-14 ENCOUNTER — LAB (OUTPATIENT)
Dept: LAB | Facility: HOSPITAL | Age: 44
End: 2019-01-14

## 2019-01-14 VITALS
RESPIRATION RATE: 14 BRPM | WEIGHT: 173.7 LBS | HEART RATE: 79 BPM | OXYGEN SATURATION: 100 % | SYSTOLIC BLOOD PRESSURE: 112 MMHG | DIASTOLIC BLOOD PRESSURE: 69 MMHG | HEIGHT: 65 IN | TEMPERATURE: 98.2 F | BODY MASS INDEX: 28.94 KG/M2

## 2019-01-14 DIAGNOSIS — R91.8 MULTIPLE LUNG NODULES ON CT: Primary | ICD-10-CM

## 2019-01-14 LAB
BASOPHILS # BLD AUTO: 0.04 10*3/MM3 (ref 0–0.1)
BASOPHILS NFR BLD AUTO: 0.4 % (ref 0–1.1)
DEPRECATED RDW RBC AUTO: 41 FL (ref 37–49)
EOSINOPHIL # BLD AUTO: 0.15 10*3/MM3 (ref 0–0.36)
EOSINOPHIL NFR BLD AUTO: 1.4 % (ref 1–5)
ERYTHROCYTE [DISTWIDTH] IN BLOOD BY AUTOMATED COUNT: 12.8 % (ref 11.7–14.5)
HCT VFR BLD AUTO: 39.2 % (ref 34–45)
HGB BLD-MCNC: 13.8 G/DL (ref 11.5–14.9)
IMM GRANULOCYTES # BLD AUTO: 0.07 10*3/MM3 (ref 0–0.03)
IMM GRANULOCYTES NFR BLD AUTO: 0.7 % (ref 0–0.5)
LYMPHOCYTES # BLD AUTO: 1.98 10*3/MM3 (ref 1–3.5)
LYMPHOCYTES NFR BLD AUTO: 19 % (ref 20–49)
MCH RBC QN AUTO: 31 PG (ref 27–33)
MCHC RBC AUTO-ENTMCNC: 35.2 G/DL (ref 32–35)
MCV RBC AUTO: 88.1 FL (ref 83–97)
MONOCYTES # BLD AUTO: 0.59 10*3/MM3 (ref 0.25–0.8)
MONOCYTES NFR BLD AUTO: 5.7 % (ref 4–12)
NEUTROPHILS # BLD AUTO: 7.57 10*3/MM3 (ref 1.5–7)
NEUTROPHILS NFR BLD AUTO: 72.8 % (ref 39–75)
NRBC BLD AUTO-RTO: 0 /100 WBC (ref 0–0)
PLATELET # BLD AUTO: 172 10*3/MM3 (ref 150–375)
PMV BLD AUTO: 10.6 FL (ref 8.9–12.1)
RBC # BLD AUTO: 4.45 10*6/MM3 (ref 3.9–5)
WBC NRBC COR # BLD: 10.4 10*3/MM3 (ref 4–10)

## 2019-01-14 PROCEDURE — 99213 OFFICE O/P EST LOW 20 MIN: CPT | Performed by: INTERNAL MEDICINE

## 2019-01-14 PROCEDURE — 85025 COMPLETE CBC W/AUTO DIFF WBC: CPT | Performed by: INTERNAL MEDICINE

## 2019-01-14 PROCEDURE — 36415 COLL VENOUS BLD VENIPUNCTURE: CPT | Performed by: INTERNAL MEDICINE

## 2019-01-14 NOTE — PROGRESS NOTES
Subjective   REASON FOR FOLLOW-UP:   History of pulmonary embolism; lung nodules                               REQUESTING PHYSICIAN:  Dr. Ireland    RECORDS OBTAINED:  Records of the patients history including those obtained from the referring provider were reviewed and summarized in detail.    History of Present Illness   Sherri returned today for follow-up doing well.  She still feels like her exercise capacity is not the same as before her pulmonary embolism but she is exercising 5 days a week with overall good tolerance.  She denies pain in the chest or cough.  She remains off estrogen.  Anticoagulation was discontinued September 2018.    Hematology History:  This is a 42-year-old woman who presented to her primary care physician 3/22/18 with complaints of waxing and waning shortness of breath that had been present the previous 3-4 weeks.  Shortness of breath was worse with exertion.  She denied fever or leg swelling.  She did have associated abdominal pain in the lower quadrants 3 out of 10 also waxing and waning with no alleviating or aggravating factors.  A chest x-ray was performed which showed no acute findings.  The patient was referred for a CT angiogram at [facility] which showed a central filling defect in the right lower lobe subsegmental artery with no other filling defects seen.  Central pulmonary arteries were normal in caliber.  There was no consolidation, masses or nodules.  Bilateral breast implants were noted.  The bones were unremarkable.  The patient was started on Xarelto 3/27/18.  She has since undergone a CT of the abdomen and pelvis on 4/2/18 which showed no acute findings, no masses or lymphadenopathy.  There were no clots noted in the pelvis.  She has had Doppler ultrasound of the bilateral lower extremity which was normal and a Doppler ultrasound of the bilateral upper extremity which was normal.    Prior to the pulmonary embolism, the patient was on an oral contraceptive with  estrogen which has now been discontinued.  She did not have any long distance travel or surgical procedures.  She has no known clotting disorders that run in her family or immediate family members with deep venous thromboses, although some of her biologic father's history is unknown.  She is up to date on mammography and Pap/pelvic exams.    Hypercoagulable evaluation 18:  Antithrombin III-97%  Protein S antigen antigen-75%  Protein S functional-103%  Protein C activity-115%  Factor V Leiden-negative  Prothrombin gene mutation-negative  Anticardiolipin antibody profile-negative  Beta-2 glycoprotein 1 profile-negative  Lupus anticoagulant-positive    The patient returned on 18 for follow-up having completed 6 months of anticoagulation and CT scan review.  For the past 2-3 weeks she reports increasing dyspnea at rest and with exertion feeling like she cannot take a deep breath.  She denies significant cough, fever, chills, leg swelling, chest pain.  CT angiogram of the chest 18 showed no pulmonary thromboembolism, no evidence of right heart strain.  Anticoagulation was discontinued.  Lupus anticoagulant was repeated after discontinuation of Xarelto and was negative at that time.    Past Medical History:   Diagnosis Date   • Anesthesia complication     VOMITING   • Bicornuate uterus    • Depression     mild   • Gall stones    • PONV (postoperative nausea and vomiting)         Past Surgical History:   Procedure Laterality Date   • BREAST AUGMENTATION Bilateral    •  SECTION  , 2007    x2   • CHOLECYSTECTOMY  2006   • CHOLECYSTECTOMY WITH INTRAOPERATIVE CHOLANGIOGRAM N/A 2016    Procedure: LAPAROSCOPIC CHOLECYSTECTOMY WITH INTRAOPERATIVE CHOLANGIOGRAM;  Surgeon: Nelly Jacobson MD;  Location: [facility];  Service:    • COSMETIC SURGERY     • DILATATION AND CURETTAGE  2005   • DILATATION AND CURETTAGE  2003        Current Outpatient Medications on File Prior to Visit  "  Medication Sig Dispense Refill   • cetirizine (ZyrTEC) 10 MG tablet Take 10 mg by mouth daily.     • Prenatal Vit-Fe Fumarate-FA (PRENATAL VITAMIN PO) Take 1 tablet by mouth Daily.     • rivaroxaban (XARELTO) 20 MG tablet Take 1 tablet by mouth Daily With Dinner. 90 tablet 1     No current facility-administered medications on file prior to visit.         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   HENT: Negative.    Eyes: Negative.    Respiratory: Positive for shortness of breath. Negative for choking, wheezing and stridor.    Cardiovascular: Negative.    Gastrointestinal: Negative.    Genitourinary: Negative.    Musculoskeletal: Negative.    Skin: Negative.    Neurological: Negative.    Hematological: Negative.    Psychiatric/Behavioral: Negative.      ROS unchanged-1/14/19    Objective     Vitals:    01/14/19 1303   BP: 112/69   Pulse: 79   Resp: 14   Temp: 98.2 °F (36.8 °C)   TempSrc: Oral   SpO2: 100%   Weight: 78.8 kg (173 lb 11.2 oz)   Height: 166 cm (65.35\")   PainSc: 0-No pain     Current Status 1/14/2019   ECOG score 0       Physical Exam    CON: pleasant woman in no distress  HEENT: no icterus, no thrush  NECK: no jvd, no lad  CV: RRR, S1S2, no murmur  RESP: cta bilat, non-labored, no wheezing, no dullness  GI: soft, nontender, " +bs  MS: no LE edema  SKIN: no petechiae or bruising  NEURO: alert and oriented x3, normal strength  PSYCH: normal mood    Physical exam performed and unchanged from above-1/14/19    RECENT LABS:  Hematology WBC   Date Value Ref Range Status   01/14/2019 10.40 (H) 4.00 - 10.00 10*3/mm3 Final     RBC   Date Value Ref Range Status   01/14/2019 4.45 3.90 - 5.00 10*6/mm3 Final     Hemoglobin   Date Value Ref Range Status   01/14/2019 13.8 11.5 - 14.9 g/dL Final     Hematocrit   Date Value Ref Range Status   01/14/2019 39.2 34.0 - 45.0 % Final     Platelets   Date Value Ref Range Status   01/14/2019 172 150 - 375 10*3/mm3 Final      CT angiogram 9/19/18: There is a tiny filling defect in the right upper lobe which does not appear acute.  No right heart strain.  No pulmonary infiltrates.  There are a few subcentimeter nodules in the left lung.    Hypercoagulable evaluation 4/11/18:  Antithrombin III-97%  Protein S antigen antigen-75%  Protein S functional-103%  Protein C activity-115%  Factor V Leiden-negative  Prothrombin gene mutation-negative  Anticardiolipin antibody profile-negative  Beta-2 glycoprotein 1 profile-negative  Lupus anticoagulant-positive  Lupus anticoagulant repeated after discontinuation of Xarelto-negative    CT chest 1/7/19:  IMPRESSION:  No interval change in tiny pleural-based left pulmonary  nodules as compared to prior examination 03/26/2018. Borderline enlarged  precarinal lymph node is decreased in size. No new nodule or new  Abnormality.    Assessment:    This is a 42-year-old woman with a first-time incident of pulmonary embolism noted by CT scan 3/26/18 after she presented with dyspnea on exertion.  CT of the abdomen and pelvis showed no pelvic thromboses, and Doppler ultrasounds of the upper and lower extremities were negative.  Follow-up CT angiogram 9/19/18 shows no acute thromboembolism.  There is a tiny filling defect in the right upper lobe.  I am not sure if this is the same filling  defect seen at [place] which was read as the right lower lobe?    The patient's main risk factor for thrombosis was the fact she was on an estrogen containing oral contraceptive at the time of the pulmonary embolism.  The OCP has been discontinued.      The patient has no familial history of thrombosis to her knowledge.    Positive lupus anticoagulant while on Xarelto; remainder of the hypercoagulable profile was negative.  She completed 6 months of anticoagulation September 2018.  Lupus anticoagulant was rechecked after discontinuation of Xarelto and negative.    Tiny left lung nodules:  Repeat CT of chest reviewed today shows stable tiny left-sided pleural-based pulmonary nodules compared to 3/26/18.  I recommended/ordered a one-year follow-up scan to ensure 2 years of stability.    Recommendations:  CT chest 1 year with M.D. follow-up to review results.

## 2019-01-28 ENCOUNTER — TELEPHONE (OUTPATIENT)
Dept: ONCOLOGY | Facility: HOSPITAL | Age: 44
End: 2019-01-28

## 2019-01-28 NOTE — TELEPHONE ENCOUNTER
Regarding: FW: Visit Follow-Up Question  Contact: [phone]  Let her know I would consider Xarelto 10 mg daily for 7 days after her hysterectomy to reduce risk of DVT.  Her GYN would need to let her know when safe to start after surgery.  When she knows a date she can call us for prescription.  Copy note to chart so we remember plan.  ----- Message -----  From: Sapphire Andrade RN  Sent: 1/28/2019   8:01 AM  To: Prasanth Witt MD  Subject: FW: Visit Follow-Up Question                         ----- Message -----  From: Sherri Espinoza  Sent: 1/26/2019  11:58 PM  To: [facility]  Subject: Visit Follow-Up Question                         ----- Message from Mychart, Generic sent at 1/26/2019 11:58 PM EST -----    Dr Witt-   I have spoken with my GYN in reference to a possible hysterectomy due to my every 2 weeks periods over the last few months.     She wanted me to ask you about  pre or post operative recommendations.   “Since major surgery does increase risk for a blood clot, so I would want recommendations from your hematologist if he recommends any prophylactic blood thinners post surgery. “    Thanks-   Sherri “Gia” Alexis

## 2019-02-05 ENCOUNTER — TELEPHONE (OUTPATIENT)
Dept: ONCOLOGY | Facility: HOSPITAL | Age: 44
End: 2019-02-05

## 2019-02-05 NOTE — TELEPHONE ENCOUNTER
----- Message from Maribel Barfield sent at 2/5/2019  2:32 PM EST -----  Regarding: RE: NEEDS SAMPLE  I actually do have a bottle of 10 mg with 7 tabs in it.   ----- Message -----  From: Julieta Yeager RN  Sent: 2/5/2019   1:43 PM  To: Maribel Barfield  Subject: NEEDS SAMPLE                                     PT IS NEEDING XARELTO SAMPLES 10MG NEEDS #7   WE GOT ANY TO SPARE??? THANK YOU!

## 2019-02-05 NOTE — TELEPHONE ENCOUNTER
PT NEEDING XARELTO SAMPLES. SEE PREVIOUS NURSING NOTES. PT TO TAKE XARELTO 10MG DAILY X 7 DAYS POST SURGERY FOR DVT PROPHYLAXIS. KENISHA WILL SET ASIDE FOR THE PT AND PT NOTIFIED TO P/U.

## 2019-04-16 DIAGNOSIS — Z01.84 IMMUNITY STATUS TESTING: Primary | ICD-10-CM

## 2019-04-19 LAB
MEV IGG SER IA-ACNC: 264 AU/ML
MUV IGG SER IA-ACNC: 155 AU/ML
RUBV IGG SERPL IA-ACNC: 1.78 INDEX

## 2019-04-20 ENCOUNTER — RESULTS ENCOUNTER (OUTPATIENT)
Dept: FAMILY MEDICINE CLINIC | Facility: CLINIC | Age: 44
End: 2019-04-20

## 2019-04-20 DIAGNOSIS — Z01.84 IMMUNITY STATUS TESTING: ICD-10-CM

## 2019-11-15 ENCOUNTER — OFFICE VISIT (OUTPATIENT)
Dept: FAMILY MEDICINE CLINIC | Facility: CLINIC | Age: 44
End: 2019-11-15

## 2019-11-15 VITALS
OXYGEN SATURATION: 99 % | WEIGHT: 175.3 LBS | HEIGHT: 65 IN | SYSTOLIC BLOOD PRESSURE: 106 MMHG | HEART RATE: 70 BPM | BODY MASS INDEX: 29.2 KG/M2 | DIASTOLIC BLOOD PRESSURE: 70 MMHG

## 2019-11-15 DIAGNOSIS — Z00.00 WELL ADULT EXAM: ICD-10-CM

## 2019-11-15 DIAGNOSIS — Z23 ENCOUNTER FOR IMMUNIZATION: Primary | ICD-10-CM

## 2019-11-15 DIAGNOSIS — Z12.11 COLON CANCER SCREENING: ICD-10-CM

## 2019-11-15 PROCEDURE — 90715 TDAP VACCINE 7 YRS/> IM: CPT | Performed by: INTERNAL MEDICINE

## 2019-11-15 PROCEDURE — 99396 PREV VISIT EST AGE 40-64: CPT | Performed by: INTERNAL MEDICINE

## 2019-11-15 PROCEDURE — 90471 IMMUNIZATION ADMIN: CPT | Performed by: INTERNAL MEDICINE

## 2019-11-15 RX ORDER — METHYLPREDNISOLONE 4 MG/1
TABLET ORAL
Qty: 21 TABLET | Refills: 0 | Status: SHIPPED | OUTPATIENT
Start: 2019-11-15 | End: 2020-01-13

## 2019-11-15 NOTE — PROGRESS NOTES
"Subjective   Sherri Espinoza is a 44 y.o. female who comes in today for   Chief Complaint   Patient presents with   • Annual Exam     Pt is not fasting   .    History of Present Illness   Here CPE and feeling pretty well.  Having some pressure behind left eye that comes and goes.  Sometimes throbbing.  Sometimes pressure.  x2 weeks.  Vision is normal.  Some sinus pressure on left side.  And nasal congestion on left as well.  advil and aleve helps but doesn't make it go completely away.  Sudafed helps as well.  No cycles b/c of hysterectomy 2/2019.  Ovaries remain.  MMG are yearly and she goes today for MMG.  Has already done a flu shot this year 10/4/19.  Needs tetanus shot b/c over 10 years.  In Jan 2020, sees hematology that is following her for pulmonary nodules that were found when she was having WALTERS due to DVT and small PE due to BCP prior to her hysterectomy.  No current CP or SOA or WALTERS.  She will have routine CT f/u in Jan.  No weight loss  The following portions of the patient's history were reviewed and updated as appropriate: allergies, current medications, past family history, past medical history, past social history, past surgical history and problem list.    Review of Systems   HENT: Positive for congestion and sinus pressure.    Respiratory: Negative.    Cardiovascular: Negative.    Gastrointestinal: Negative.    Genitourinary: Negative.    Musculoskeletal: Negative.    Psychiatric/Behavioral: Negative.        /70   Pulse 70   Ht 166 cm (65.35\")   Wt 79.5 kg (175 lb 4.8 oz)   SpO2 99%   BMI 28.86 kg/m²     [facility] Fall Risk Assessment has not been completed.    PHQ-2/PHQ-9 Depression Screening 3/22/2018   Little interest or pleasure in doing things 0   Feeling down, depressed, or hopeless 0   Total Score 0       Objective   Physical Exam   Constitutional: She is oriented to person, place, and time. She appears well-developed and well-nourished.   HENT:   Head: Normocephalic and atraumatic. "   Right Ear: External ear normal.   Left Ear: External ear normal.   Mouth/Throat: Oropharynx is clear and moist.   Eyes: Conjunctivae are normal.   Neck: Neck supple.   Cardiovascular: Normal rate, regular rhythm and normal heart sounds.   No bruits   Pulmonary/Chest: Effort normal and breath sounds normal. No respiratory distress. She has no wheezes. She has no rales.   Abdominal: Soft. Bowel sounds are normal. She exhibits no distension and no mass. There is no tenderness.   Lymphadenopathy:     She has no cervical adenopathy.   Neurological: She is alert and oriented to person, place, and time.   Skin: Skin is warm.   Psychiatric: She has a normal mood and affect. Her behavior is normal. Judgment and thought content normal.   Nursing note and vitals reviewed.        Current Outpatient Medications:   •  cetirizine (ZyrTEC) 10 MG tablet, Take 10 mg by mouth daily., Disp: , Rfl:   •  Prenatal Vit-Fe Fumarate-FA (PRENATAL VITAMIN PO), Take 1 tablet by mouth Daily., Disp: , Rfl:   •  methylPREDNISolone (MEDROL, BYRON,) 4 MG tablet, Take as directed on package instructions., Disp: 21 tablet, Rfl: 0    Assessment/Plan   Sherri was seen today for annual exam.    Diagnoses and all orders for this visit:    Encounter for immunization  -     Tdap Vaccine Greater Than or Equal To 6yo IM    Well adult exam  -     Comprehensive Metabolic Panel; Future  -     CBC & Differential; Future  -     Lipid Panel With LDL / HDL Ratio; Future  -     TSH; Future  -     T4, Free; Future  -     Urinalysis With Culture If Indicated -; Future  -     Vitamin D 25 Hydroxy; Future    Colon cancer screening  -     Ambulatory Referral to Gastroenterology    Other orders  -     methylPREDNISolone (MEDROL, BYRON,) 4 MG tablet; Take as directed on package instructions.    Tdap today  Fasting labs today for CPE  Continue diet and exercise  Preventive counseling performed  MMG today  CN is due anytime between 45-50; referral placed to   [facility]                 I have asked for the patient to return to clinic in 6month(s).

## 2019-11-15 NOTE — PATIENT INSTRUCTIONS
MyPlate from USDA    MyPlate is an outline of a general healthy diet based on the 2010 Dietary Guidelines for Americans, from the U.S. Department of Agriculture (USDA). It sets guidelines for how much food you should eat from each food group based on your age, sex, and level of physical activity.  What are tips for following MyPlate?  To follow MyPlate recommendations:  · Eat a wide variety of fruits and vegetables, grains, and protein foods.  · Serve smaller portions and eat less food throughout the day.  · Limit portion sizes to avoid overeating.  · Enjoy your food.  · Get at least 150 minutes of exercise every week. This is about 30 minutes each day, 5 or more days per week.  It can be difficult to have every meal look like MyPlate. Think about MyPlate as eating guidelines for an entire day, rather than each individual meal.  Fruits and vegetables  · Make half of your plate fruits and vegetables.  · Eat many different colors of fruits and vegetables each day.  · For a 2,000 calorie daily food plan, eat:  ? 2½ cups of vegetables every day.  ? 2 cups of fruit every day.  · 1 cup is equal to:  ? 1 cup raw or cooked vegetables.  ? 1 cup raw fruit.  ? 1 medium-sized orange, apple, or banana.  ? 1 cup 100% fruit or vegetable juice.  ? 2 cups raw leafy greens, such as lettuce, spinach, or kale.  ? ½ cup dried fruit.  Grains  · One fourth of your plate should be grains.  · Make at least half of the grains you eat each day whole grains.  · For a 2,000 calorie daily food plan, eat 6 oz of grains every day.  · 1 oz is equal to:  ? 1 slice bread.  ? 1 cup cereal.  ? ½ cup cooked rice, cereal, or pasta.  Protein  · One fourth of your plate should be protein.  · Eat a wide variety of protein foods, including meat, poultry, fish, eggs, beans, nuts, and tofu.  · For a 2,000 calorie daily food plan, eat 5½ oz of protein every day.  · 1 oz is equal to:  ? 1 oz meat, poultry, or fish.  ? ¼ cup cooked beans.  ? 1 egg.  ? ½ oz  nuts or seeds.  ? 1 Tbsp peanut butter.  Dairy  · Drink fat-free or low-fat (1%) milk.  · Eat or drink dairy as a side to meals.  · For a 2,000 calorie daily food plan, eat or drink 3 cups of dairy every day.  · 1 cup is equal to:  ? 1 cup milk, yogurt, cottage cheese, or soy milk (soy beverage).  ? 2 oz processed cheese.  ? 1½ oz natural cheese.  Fats, oils, salt, and sugars  · Only small amounts of oils are recommended.  · Avoid foods that are high in calories and low in nutritional value (empty calories), like foods high in fat or added sugars.  · Choose foods that are low in salt (sodium). Choose foods that have less than 140 milligrams (mg) of sodium per serving.  · Drink water instead of sugary drinks. Drink enough water each day to keep your urine pale yellow.  Where to find support  · Work with your health care provider or a nutrition specialist (dietitian) to develop a customized eating plan that is right for you.  · Download an barbara (mobile application) to help you track your daily food intake.  Where to find more information  · Go to ChooseMyPlate.gov for more information.  · Learn more and log your daily food intake according to MyPlate using USDA's Codigamescker: www.Cursa.mecker.usda.gov  Summary  · MyPlate is a general guideline for healthy eating from the USDA. It is based on the 2010 Dietary Guidelines for Americans.  · In general, fruits and vegetables should take up ½ of your plate, grains should take up ¼ of your plate, and protein should take up ¼ of your plate.  This information is not intended to replace advice given to you by your health care provider. Make sure you discuss any questions you have with your health care provider.  Document Released: 01/06/2009 Document Revised: 03/19/2018 Document Reviewed: 03/19/2018  [employer] Interactive Patient Education © 2019 [employer].

## 2019-11-19 DIAGNOSIS — Z00.00 WELL ADULT EXAM: ICD-10-CM

## 2019-11-21 LAB
25(OH)D3+25(OH)D2 SERPL-MCNC: 33.6 NG/ML (ref 30–100)
ALBUMIN SERPL-MCNC: 4.7 G/DL (ref 3.5–5.2)
ALBUMIN/GLOB SERPL: 2.4 G/DL
ALP SERPL-CCNC: 54 U/L (ref 39–117)
ALT SERPL-CCNC: 18 U/L (ref 1–33)
APPEARANCE UR: CLEAR
AST SERPL-CCNC: 18 U/L (ref 1–32)
BACTERIA #/AREA URNS HPF: NORMAL /HPF
BASOPHILS # BLD AUTO: 0.04 10*3/MM3 (ref 0–0.2)
BASOPHILS NFR BLD AUTO: 0.4 % (ref 0–1.5)
BILIRUB SERPL-MCNC: 0.4 MG/DL (ref 0.2–1.2)
BILIRUB UR QL STRIP: NEGATIVE
BUN SERPL-MCNC: 13 MG/DL (ref 6–20)
BUN/CREAT SERPL: 17.3 (ref 7–25)
CALCIUM SERPL-MCNC: 9.3 MG/DL (ref 8.6–10.5)
CHLORIDE SERPL-SCNC: 104 MMOL/L (ref 98–107)
CHOLEST SERPL-MCNC: 138 MG/DL (ref 0–200)
CO2 SERPL-SCNC: 26.8 MMOL/L (ref 22–29)
COLOR UR: YELLOW
CREAT SERPL-MCNC: 0.75 MG/DL (ref 0.57–1)
EOSINOPHIL # BLD AUTO: 0.13 10*3/MM3 (ref 0–0.4)
EOSINOPHIL NFR BLD AUTO: 1.3 % (ref 0.3–6.2)
EPI CELLS #/AREA URNS HPF: NORMAL /HPF
ERYTHROCYTE [DISTWIDTH] IN BLOOD BY AUTOMATED COUNT: 12.3 % (ref 12.3–15.4)
GLOBULIN SER CALC-MCNC: 2 GM/DL
GLUCOSE SERPL-MCNC: 80 MG/DL (ref 65–99)
GLUCOSE UR QL: NEGATIVE
HCT VFR BLD AUTO: 41.3 % (ref 34–46.6)
HDLC SERPL-MCNC: 52 MG/DL (ref 40–60)
HGB BLD-MCNC: 14.3 G/DL (ref 12–15.9)
HGB UR QL STRIP: NEGATIVE
IMM GRANULOCYTES # BLD AUTO: 0.07 10*3/MM3 (ref 0–0.05)
IMM GRANULOCYTES NFR BLD AUTO: 0.7 % (ref 0–0.5)
KETONES UR QL STRIP: NEGATIVE
LDLC SERPL CALC-MCNC: 74 MG/DL (ref 0–100)
LDLC/HDLC SERPL: 1.42 {RATIO}
LEUKOCYTE ESTERASE UR QL STRIP: NEGATIVE
LYMPHOCYTES # BLD AUTO: 0.98 10*3/MM3 (ref 0.7–3.1)
LYMPHOCYTES NFR BLD AUTO: 9.6 % (ref 19.6–45.3)
MCH RBC QN AUTO: 31.2 PG (ref 26.6–33)
MCHC RBC AUTO-ENTMCNC: 34.6 G/DL (ref 31.5–35.7)
MCV RBC AUTO: 90 FL (ref 79–97)
MICRO URNS: NORMAL
MICRO URNS: NORMAL
MONOCYTES # BLD AUTO: 0.62 10*3/MM3 (ref 0.1–0.9)
MONOCYTES NFR BLD AUTO: 6 % (ref 5–12)
MUCOUS THREADS URNS QL MICRO: PRESENT /HPF
NEUTROPHILS # BLD AUTO: 8.42 10*3/MM3 (ref 1.7–7)
NEUTROPHILS NFR BLD AUTO: 82 % (ref 42.7–76)
NITRITE UR QL STRIP: NEGATIVE
NRBC BLD AUTO-RTO: 0 /100 WBC (ref 0–0.2)
PH UR STRIP: 5.5 [PH] (ref 5–7.5)
PLATELET # BLD AUTO: 217 10*3/MM3 (ref 140–450)
POTASSIUM SERPL-SCNC: 4.3 MMOL/L (ref 3.5–5.2)
PROT SERPL-MCNC: 6.7 G/DL (ref 6–8.5)
PROT UR QL STRIP: NEGATIVE
RBC # BLD AUTO: 4.59 10*6/MM3 (ref 3.77–5.28)
RBC #/AREA URNS HPF: NORMAL /HPF
SODIUM SERPL-SCNC: 140 MMOL/L (ref 136–145)
SP GR UR: 1.02 (ref 1–1.03)
T4 FREE SERPL-MCNC: 1.07 NG/DL (ref 0.93–1.7)
TRIGL SERPL-MCNC: 61 MG/DL (ref 0–150)
TSH SERPL DL<=0.005 MIU/L-ACNC: 1.05 UIU/ML (ref 0.27–4.2)
URINALYSIS REFLEX: NORMAL
UROBILINOGEN UR STRIP-MCNC: 0.2 MG/DL (ref 0.2–1)
VLDLC SERPL CALC-MCNC: 12.2 MG/DL
WBC # BLD AUTO: 10.26 10*3/MM3 (ref 3.4–10.8)
WBC #/AREA URNS HPF: NORMAL /HPF

## 2019-12-03 RX ORDER — AMOXICILLIN AND CLAVULANATE POTASSIUM 875; 125 MG/1; MG/1
1 TABLET, FILM COATED ORAL 2 TIMES DAILY
Qty: 20 TABLET | Refills: 0 | Status: SHIPPED | OUTPATIENT
Start: 2019-12-03 | End: 2020-01-13

## 2020-01-13 ENCOUNTER — LAB (OUTPATIENT)
Dept: LAB | Facility: HOSPITAL | Age: 45
End: 2020-01-13

## 2020-01-13 ENCOUNTER — OFFICE VISIT (OUTPATIENT)
Dept: ONCOLOGY | Facility: CLINIC | Age: 45
End: 2020-01-13

## 2020-01-13 VITALS
WEIGHT: 175.8 LBS | HEIGHT: 65 IN | SYSTOLIC BLOOD PRESSURE: 114 MMHG | HEART RATE: 66 BPM | OXYGEN SATURATION: 100 % | BODY MASS INDEX: 29.29 KG/M2 | DIASTOLIC BLOOD PRESSURE: 74 MMHG | RESPIRATION RATE: 12 BRPM | TEMPERATURE: 98 F

## 2020-01-13 DIAGNOSIS — R91.8 MULTIPLE LUNG NODULES ON CT: Primary | ICD-10-CM

## 2020-01-13 LAB
BASOPHILS # BLD AUTO: 0.04 10*3/MM3 (ref 0–0.2)
BASOPHILS NFR BLD AUTO: 0.5 % (ref 0–1.5)
DEPRECATED RDW RBC AUTO: 42.2 FL (ref 37–54)
EOSINOPHIL # BLD AUTO: 0.19 10*3/MM3 (ref 0–0.4)
EOSINOPHIL NFR BLD AUTO: 2.3 % (ref 0.3–6.2)
ERYTHROCYTE [DISTWIDTH] IN BLOOD BY AUTOMATED COUNT: 12.8 % (ref 12.3–15.4)
HCT VFR BLD AUTO: 40 % (ref 34–46.6)
HGB BLD-MCNC: 13.8 G/DL (ref 12–15.9)
IMM GRANULOCYTES # BLD AUTO: 0.05 10*3/MM3 (ref 0–0.05)
IMM GRANULOCYTES NFR BLD AUTO: 0.6 % (ref 0–0.5)
LYMPHOCYTES # BLD AUTO: 1.84 10*3/MM3 (ref 0.7–3.1)
LYMPHOCYTES NFR BLD AUTO: 21.9 % (ref 19.6–45.3)
MCH RBC QN AUTO: 30.8 PG (ref 26.6–33)
MCHC RBC AUTO-ENTMCNC: 34.5 G/DL (ref 31.5–35.7)
MCV RBC AUTO: 89.3 FL (ref 79–97)
MONOCYTES # BLD AUTO: 0.57 10*3/MM3 (ref 0.1–0.9)
MONOCYTES NFR BLD AUTO: 6.8 % (ref 5–12)
NEUTROPHILS # BLD AUTO: 5.71 10*3/MM3 (ref 1.7–7)
NEUTROPHILS NFR BLD AUTO: 67.9 % (ref 42.7–76)
NRBC BLD AUTO-RTO: 0.4 /100 WBC (ref 0–0.2)
PLATELET # BLD AUTO: 242 10*3/MM3 (ref 140–450)
PMV BLD AUTO: 10.4 FL (ref 6–12)
RBC # BLD AUTO: 4.48 10*6/MM3 (ref 3.77–5.28)
WBC NRBC COR # BLD: 8.4 10*3/MM3 (ref 3.4–10.8)

## 2020-01-13 PROCEDURE — 85025 COMPLETE CBC W/AUTO DIFF WBC: CPT

## 2020-01-13 PROCEDURE — 36416 COLLJ CAPILLARY BLOOD SPEC: CPT

## 2020-01-13 PROCEDURE — 99213 OFFICE O/P EST LOW 20 MIN: CPT | Performed by: INTERNAL MEDICINE

## 2020-01-13 NOTE — PROGRESS NOTES
Subjective   REASON FOR FOLLOW-UP:   History of pulmonary embolism; lung nodules                               REQUESTING PHYSICIAN:  Dr. Ireland    RECORDS OBTAINED:  Records of the patients history including those obtained from the referring provider were reviewed and summarized in detail.    History of Present Illness   Sherri returned today for follow-up doing well.  She is doing well with no significant shortness of breath or cough.  She denies recurrence of DVT or PE.  She has undergone a hysterectomy.    Hematology History:  This is a 42-year-old woman who presented to her primary care physician 3/22/18 with complaints of waxing and waning shortness of breath that had been present the previous 3-4 weeks.  Shortness of breath was worse with exertion.  She denied fever or leg swelling.  She did have associated abdominal pain in the lower quadrants 3 out of 10 also waxing and waning with no alleviating or aggravating factors.  A chest x-ray was performed which showed no acute findings.  The patient was referred for a CT angiogram at [facility] which showed a central filling defect in the right lower lobe subsegmental artery with no other filling defects seen.  Central pulmonary arteries were normal in caliber.  There was no consolidation, masses or nodules.  Bilateral breast implants were noted.  The bones were unremarkable.  The patient was started on Xarelto 3/27/18.  She has since undergone a CT of the abdomen and pelvis on 4/2/18 which showed no acute findings, no masses or lymphadenopathy.  There were no clots noted in the pelvis.  She has had Doppler ultrasound of the bilateral lower extremity which was normal and a Doppler ultrasound of the bilateral upper extremity which was normal.    Prior to the pulmonary embolism, the patient was on an oral contraceptive with estrogen which has now been discontinued.  She did not have any long distance travel or surgical procedures.  She has no known clotting  disorders that run in her family or immediate family members with deep venous thromboses, although some of her biologic father's history is unknown.  She is up to date on mammography and Pap/pelvic exams.    Hypercoagulable evaluation 18:  Antithrombin III-97%  Protein S antigen antigen-75%  Protein S functional-103%  Protein C activity-115%  Factor V Leiden-negative  Prothrombin gene mutation-negative  Anticardiolipin antibody profile-negative  Beta-2 glycoprotein 1 profile-negative  Lupus anticoagulant-positive    The patient returned on 18 for follow-up having completed 6 months of anticoagulation and CT scan review.  For the past 2-3 weeks she reports increasing dyspnea at rest and with exertion feeling like she cannot take a deep breath.  She denies significant cough, fever, chills, leg swelling, chest pain.  CT angiogram of the chest 18 showed no pulmonary thromboembolism, no evidence of right heart strain.  Anticoagulation was discontinued.  Lupus anticoagulant was repeated after discontinuation of Xarelto and was negative at that time.    Past Medical History:   Diagnosis Date   • Anesthesia complication     VOMITING   • Bicornuate uterus    • Depression     mild   • Gall stones    • PONV (postoperative nausea and vomiting)         Past Surgical History:   Procedure Laterality Date   • BREAST AUGMENTATION Bilateral    •  SECTION  , 2007    x2   • CHOLECYSTECTOMY  2006   • CHOLECYSTECTOMY WITH INTRAOPERATIVE CHOLANGIOGRAM N/A 2016    Procedure: LAPAROSCOPIC CHOLECYSTECTOMY WITH INTRAOPERATIVE CHOLANGIOGRAM;  Surgeon: Nelly Jacobson MD;  Location: [facility];  Service:    • COSMETIC SURGERY     • DILATATION AND CURETTAGE  2005   • DILATATION AND CURETTAGE  2003   • HYSTERECTOMY  2019        Current Outpatient Medications on File Prior to Visit   Medication Sig Dispense Refill   • cetirizine (ZyrTEC) 10 MG tablet Take 10 mg by mouth daily.     •  "Prenatal Vit-Fe Fumarate-FA (PRENATAL VITAMIN PO) Take 1 tablet by mouth Daily.     • tobramycin in sterile water (preservative free) injection-balanced salts ophthalmic solution Apply 1-2 drops to eye(s) as directed by provider Every 4 (Four) Hours. 5 mL 0   • [DISCONTINUED] amoxicillin-clavulanate (AUGMENTIN) 875-125 MG per tablet Take 1 tablet by mouth 2 (Two) Times a Day. 20 tablet 0   • [DISCONTINUED] methylPREDNISolone (MEDROL, BYRON,) 4 MG tablet Take as directed on package instructions. 21 tablet 0     No current facility-administered medications on file prior to visit.         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   HENT: Negative.    Eyes: Negative.    Respiratory: Negative for choking, shortness of breath, wheezing and stridor.    Cardiovascular: Negative.    Gastrointestinal: Negative.    Genitourinary: Negative.    Musculoskeletal: Negative.    Skin: Negative.    Neurological: Negative.    Hematological: Negative.    Psychiatric/Behavioral: Negative.        Objective     Vitals:    01/13/20 1619   BP: 114/74   Pulse: 66   Resp: 12   Temp: 98 °F (36.7 °C)   TempSrc: Oral   SpO2: 100%   Weight: 79.7 kg (175 lb 12.8 oz)   Height: 166 cm (65.35\")   PainSc: 0-No pain     Current Status 1/13/2020   ECOG score 0       Physical Exam "    CON: pleasant woman in no distress  HEENT: no icterus, no thrush  NECK: no jvd, no lad  CV: RRR, S1S2, no murmur  RESP: cta bilat, non-labored, no wheezing, no dullness  GI: soft, nontender, +bs  MS: no LE edema  SKIN: no petechiae or bruising  NEURO: alert and oriented x3, normal strength  PSYCH: normal mood    Physical exam performed and unchanged from above- 1/13/2020    RECENT LABS:  Hematology WBC   Date Value Ref Range Status   01/13/2020 8.40 3.40 - 10.80 10*3/mm3 Final   11/20/2019 10.26 3.40 - 10.80 10*3/mm3 Final   02/23/2019 10.83 4.5 - 11.0 10*3/uL Final     RBC   Date Value Ref Range Status   01/13/2020 4.48 3.77 - 5.28 10*6/mm3 Final   11/20/2019 4.59 3.77 - 5.28 10*6/mm3 Final   02/23/2019 4.20 4.0 - 5.2 10*6/uL Final     Hemoglobin   Date Value Ref Range Status   01/13/2020 13.8 12.0 - 15.9 g/dL Final   02/23/2019 12.7 12.0 - 16.0 g/dL Final     Hematocrit   Date Value Ref Range Status   01/13/2020 40.0 34.0 - 46.6 % Final   02/23/2019 38.5 36.0 - 46.0 % Final     Platelets   Date Value Ref Range Status   01/13/2020 242 140 - 450 10*3/mm3 Final   02/23/2019 169 140 - 440 10*3/uL Final      CT angiogram 9/19/18: There is a tiny filling defect in the right upper lobe which does not appear acute.  No right heart strain.  No pulmonary infiltrates.  There are a few subcentimeter nodules in the left lung.    Hypercoagulable evaluation 4/11/18:  Antithrombin III-97%  Protein S antigen antigen-75%  Protein S functional-103%  Protein C activity-115%  Factor V Leiden-negative  Prothrombin gene mutation-negative  Anticardiolipin antibody profile-negative  Beta-2 glycoprotein 1 profile-negative  Lupus anticoagulant-positive  Lupus anticoagulant repeated after discontinuation of Xarelto-negative    CT chest 1/7/19:  IMPRESSION:  No interval change in tiny pleural-based left pulmonary  nodules as compared to prior examination 03/26/2018. Borderline enlarged  precarinal lymph node is decreased in size. No new  nodule or new  Abnormality.    CT chest 12/31/2019 showed stable subpleural nodules felt to be benign    Assessment:    This is a 42-year-old woman with a first-time incident of pulmonary embolism noted by CT scan 3/26/18 after she presented with dyspnea on exertion.  CT of the abdomen and pelvis showed no pelvic thromboses, and Doppler ultrasounds of the upper and lower extremities were negative.  Follow-up CT angiogram 9/19/18 shows no acute thromboembolism.  There is a tiny filling defect in the right upper lobe.  I am not sure if this is the same filling defect seen at [place] which was read as the right lower lobe?    The patient's main risk factor for thrombosis was the fact she was on an estrogen containing oral contraceptive at the time of the pulmonary embolism.  The OCP has been discontinued.  She has now had a hysterectomy.    The patient has no familial history of thrombosis to her knowledge.    Initial evaluation was positive for a lupus anticoagulant while on Xarelto; remainder of the hypercoagulable profile was negative.  She completed 6 months of anticoagulation September 2018.  Lupus anticoagulant was rechecked after discontinuation of Xarelto and negative.    Tiny left lung nodules: Repeat CT chest reviewed today shows stable tiny subpleural nodules which are felt to be benign.  The nodules have been stable over a two-year timeframe.  No further radiographic follow-up required at this time.    Recommendations:  Patient's care was deferred back to her primary care provider.

## 2020-02-19 VITALS
DIASTOLIC BLOOD PRESSURE: 52 MMHG | HEART RATE: 66 BPM | HEART RATE: 73 BPM | SYSTOLIC BLOOD PRESSURE: 78 MMHG | HEART RATE: 67 BPM | DIASTOLIC BLOOD PRESSURE: 76 MMHG | TEMPERATURE: 96.3 F | OXYGEN SATURATION: 99 % | RESPIRATION RATE: 23 BRPM | DIASTOLIC BLOOD PRESSURE: 45 MMHG | OXYGEN SATURATION: 97 % | HEART RATE: 75 BPM | RESPIRATION RATE: 16 BRPM | OXYGEN SATURATION: 98 % | DIASTOLIC BLOOD PRESSURE: 49 MMHG | SYSTOLIC BLOOD PRESSURE: 91 MMHG | HEART RATE: 81 BPM | OXYGEN SATURATION: 96 % | RESPIRATION RATE: 22 BRPM | RESPIRATION RATE: 17 BRPM | DIASTOLIC BLOOD PRESSURE: 44 MMHG | HEIGHT: 65 IN | SYSTOLIC BLOOD PRESSURE: 96 MMHG | HEART RATE: 78 BPM | SYSTOLIC BLOOD PRESSURE: 104 MMHG | RESPIRATION RATE: 19 BRPM | HEART RATE: 69 BPM | SYSTOLIC BLOOD PRESSURE: 93 MMHG | DIASTOLIC BLOOD PRESSURE: 46 MMHG | HEART RATE: 87 BPM | SYSTOLIC BLOOD PRESSURE: 94 MMHG | HEART RATE: 88 BPM | SYSTOLIC BLOOD PRESSURE: 114 MMHG | WEIGHT: 162 LBS | TEMPERATURE: 98.4 F | RESPIRATION RATE: 18 BRPM | SYSTOLIC BLOOD PRESSURE: 92 MMHG | RESPIRATION RATE: 20 BRPM | DIASTOLIC BLOOD PRESSURE: 56 MMHG | RESPIRATION RATE: 15 BRPM | DIASTOLIC BLOOD PRESSURE: 61 MMHG | RESPIRATION RATE: 21 BRPM | SYSTOLIC BLOOD PRESSURE: 88 MMHG | OXYGEN SATURATION: 100 % | DIASTOLIC BLOOD PRESSURE: 70 MMHG

## 2020-02-24 ENCOUNTER — OFFICE (OUTPATIENT)
Dept: URBAN - METROPOLITAN AREA PATHOLOGY 4 | Facility: PATHOLOGY | Age: 45
End: 2020-02-24
Payer: COMMERCIAL

## 2020-02-24 ENCOUNTER — AMBULATORY SURGICAL CENTER (OUTPATIENT)
Dept: URBAN - METROPOLITAN AREA SURGERY 17 | Facility: SURGERY | Age: 45
End: 2020-02-24
Payer: COMMERCIAL

## 2020-02-24 DIAGNOSIS — Z12.11 ENCOUNTER FOR SCREENING FOR MALIGNANT NEOPLASM OF COLON: ICD-10-CM

## 2020-02-24 DIAGNOSIS — D12.3 BENIGN NEOPLASM OF TRANSVERSE COLON: ICD-10-CM

## 2020-02-24 DIAGNOSIS — K63.5 POLYP OF COLON: ICD-10-CM

## 2020-02-24 DIAGNOSIS — K64.0 FIRST DEGREE HEMORRHOIDS: ICD-10-CM

## 2020-02-24 LAB
GI HISTOLOGY: A. UNSPECIFIED: (no result)
GI HISTOLOGY: PDF REPORT: (no result)

## 2020-02-24 PROCEDURE — 88305 TISSUE EXAM BY PATHOLOGIST: CPT | Mod: 33 | Performed by: INTERNAL MEDICINE

## 2020-02-24 PROCEDURE — 45380 COLONOSCOPY AND BIOPSY: CPT | Mod: 33 | Performed by: INTERNAL MEDICINE

## 2020-04-14 ENCOUNTER — TELEMEDICINE (OUTPATIENT)
Dept: FAMILY MEDICINE CLINIC | Facility: CLINIC | Age: 45
End: 2020-04-14

## 2020-04-14 DIAGNOSIS — F32.A DEPRESSION, UNSPECIFIED DEPRESSION TYPE: Primary | ICD-10-CM

## 2020-04-14 PROCEDURE — 99213 OFFICE O/P EST LOW 20 MIN: CPT | Performed by: INTERNAL MEDICINE

## 2020-04-14 RX ORDER — BUPROPION HYDROCHLORIDE 150 MG/1
150 TABLET ORAL DAILY
Qty: 90 TABLET | Refills: 0 | Status: SHIPPED | OUTPATIENT
Start: 2020-04-14 | End: 2020-07-15

## 2020-04-14 RX ORDER — HYDROXYZINE HYDROCHLORIDE 25 MG/1
25 TABLET, FILM COATED ORAL 3 TIMES DAILY PRN
Qty: 30 TABLET | Refills: 1 | Status: SHIPPED | OUTPATIENT
Start: 2020-04-14 | End: 2021-08-17

## 2020-04-14 NOTE — PATIENT INSTRUCTIONS
Good talking to you today.  Hang in there--Better days are ahead of us!  I have sent the wellbutrin and the as needed atarax to your [facility].  Let me know if you need anything.  The Atarax can be used every 8 hours as needed for anxiety or insomnia.  Hopefully this can bridge you till the Wellbutrin kicks in!  Keep exercising and calling friends.  Face time people as well--I think we need to see each other's faces!  Do a girls night with Alexys marquez!

## 2020-04-14 NOTE — PROGRESS NOTES
Subjective   Sherri Espinoza is a 44 y.o. female who comes in today for   Chief Complaint   Patient presents with   • Anxiety   • Depression   .    History of Present Illness   H/o depression.  Off wellbutrin for a few years.   Recently, past week or so, has had  Hard time falling asleep --2 am sometimes before falls asleep. Feeling sad and depressed.  Cried a lot over the weekend.  Two boys are at home and her  is working from home as well.   Exercising daily.  Getting outside to walk the dog.   Used to take wellbutrin XL for 6 years for depression and it did not cause any side effects.  Not worrying about things in particular.  More irritable at times and easily emotional.  Tearful over silly things like movies or sentimental events.      The following portions of the patient's history were reviewed and updated as appropriate: allergies, current medications, past family history, past medical history, past social history, past surgical history and problem list.    Review of Systems   Constitutional: Negative.    Psychiatric/Behavioral: Positive for agitation, dysphoric mood and sleep disturbance.       There were no vitals taken for this visit.    [facility].    PHQ-2/PHQ-9 Depression Screening 3/22/2018   Little interest or pleasure in doing things 0   Feeling down, depressed, or hopeless 0   Total Score 0       Objective   Physical Exam   Constitutional: She is oriented to person, place, and time. She appears well-developed and well-nourished.   HENT:   Head: Normocephalic and atraumatic.   Nose: Nose normal.   Eyes: Conjunctivae are normal.   Pulmonary/Chest: Effort normal. No respiratory distress.   Neurological: She is alert and oriented to person, place, and time.   Psychiatric: She has a normal mood and affect. Her behavior is normal. Judgment and thought content normal.         Current Outpatient Medications:   •  buPROPion XL (Wellbutrin XL) 150 MG 24 hr  tablet, Take 1 tablet by mouth Daily., Disp: 90 tablet, Rfl: 0  •  cetirizine (ZyrTEC) 10 MG tablet, Take 10 mg by mouth daily., Disp: , Rfl:   •  hydrOXYzine (ATARAX) 25 MG tablet, Take 1 tablet by mouth 3 (Three) Times a Day As Needed for Anxiety., Disp: 30 tablet, Rfl: 1  •  Prenatal Vit-Fe Fumarate-FA (PRENATAL VITAMIN PO), Take 1 tablet by mouth Daily., Disp: , Rfl:     Assessment/Plan   Sherri was seen today for anxiety and depression.    Diagnoses and all orders for this visit:    Depression, unspecified depression type    Other orders  -     buPROPion XL (Wellbutrin XL) 150 MG 24 hr tablet; Take 1 tablet by mouth Daily.  -     hydrOXYzine (ATARAX) 25 MG tablet; Take 1 tablet by mouth 3 (Three) Times a Day As Needed for Anxiety.        Start wellbutrin  mg qd and prn atarax for anxiety and insomnia.  Have made recommendations for trying to utilize her coping mechanisms with friends like Zoom conferences and Face time calls.  Keep exercising and getting outside when able.  Call me if not improving or worsening.   Total time CC and VV 15 minutes           I have asked for the patient to return to clinic in 4week(s).

## 2020-07-15 RX ORDER — BUPROPION HYDROCHLORIDE 150 MG/1
TABLET ORAL
Qty: 90 TABLET | Refills: 1 | Status: SHIPPED | OUTPATIENT
Start: 2020-07-15 | End: 2021-01-02

## 2020-08-24 ENCOUNTER — TELEPHONE (OUTPATIENT)
Dept: FAMILY MEDICINE CLINIC | Facility: CLINIC | Age: 45
End: 2020-08-24

## 2020-10-13 RX ORDER — ESCITALOPRAM OXALATE 5 MG/1
5 TABLET ORAL DAILY
Qty: 90 TABLET | Refills: 1 | Status: SHIPPED | OUTPATIENT
Start: 2020-10-13 | End: 2021-04-19 | Stop reason: SDUPTHER

## 2020-10-28 ENCOUNTER — RESULTS ENCOUNTER (OUTPATIENT)
Dept: FAMILY MEDICINE CLINIC | Facility: CLINIC | Age: 45
End: 2020-10-28

## 2020-10-28 DIAGNOSIS — Z00.00 WELL ADULT EXAM: Primary | ICD-10-CM

## 2020-10-28 DIAGNOSIS — Z00.00 WELL ADULT EXAM: ICD-10-CM

## 2020-11-02 ENCOUNTER — RESULTS ENCOUNTER (OUTPATIENT)
Dept: FAMILY MEDICINE CLINIC | Facility: CLINIC | Age: 45
End: 2020-11-02

## 2020-11-02 DIAGNOSIS — Z00.00 WELL ADULT EXAM: ICD-10-CM

## 2020-11-13 LAB
ALBUMIN SERPL-MCNC: 4.3 G/DL (ref 3.5–5.2)
ALBUMIN/GLOB SERPL: 2.5 G/DL
ALP SERPL-CCNC: 42 U/L (ref 39–117)
ALT SERPL-CCNC: 30 U/L (ref 1–33)
APPEARANCE UR: CLEAR
AST SERPL-CCNC: 24 U/L (ref 1–32)
BACTERIA #/AREA URNS HPF: NORMAL /HPF
BASOPHILS # BLD AUTO: 0.04 10*3/MM3 (ref 0–0.2)
BASOPHILS NFR BLD AUTO: 0.5 % (ref 0–1.5)
BILIRUB SERPL-MCNC: 0.4 MG/DL (ref 0–1.2)
BILIRUB UR QL STRIP: NEGATIVE
BUN SERPL-MCNC: 12 MG/DL (ref 6–20)
BUN/CREAT SERPL: 14.6 (ref 7–25)
CALCIUM SERPL-MCNC: 9.3 MG/DL (ref 8.6–10.5)
CHLORIDE SERPL-SCNC: 103 MMOL/L (ref 98–107)
CHOLEST SERPL-MCNC: 145 MG/DL (ref 0–200)
CO2 SERPL-SCNC: 29.3 MMOL/L (ref 22–29)
COLOR UR: YELLOW
CREAT SERPL-MCNC: 0.82 MG/DL (ref 0.57–1)
EOSINOPHIL # BLD AUTO: 0.14 10*3/MM3 (ref 0–0.4)
EOSINOPHIL NFR BLD AUTO: 1.9 % (ref 0.3–6.2)
EPI CELLS #/AREA URNS HPF: NORMAL /HPF (ref 0–10)
ERYTHROCYTE [DISTWIDTH] IN BLOOD BY AUTOMATED COUNT: 12.5 % (ref 12.3–15.4)
GLOBULIN SER CALC-MCNC: 1.7 GM/DL
GLUCOSE SERPL-MCNC: 83 MG/DL (ref 65–99)
GLUCOSE UR QL: NEGATIVE
HCT VFR BLD AUTO: 42 % (ref 34–46.6)
HDLC SERPL-MCNC: 59 MG/DL (ref 40–60)
HGB BLD-MCNC: 13.9 G/DL (ref 12–15.9)
HGB UR QL STRIP: NEGATIVE
IMM GRANULOCYTES # BLD AUTO: 0.02 10*3/MM3 (ref 0–0.05)
IMM GRANULOCYTES NFR BLD AUTO: 0.3 % (ref 0–0.5)
KETONES UR QL STRIP: NEGATIVE
LDLC SERPL CALC-MCNC: 66 MG/DL (ref 0–100)
LDLC/HDLC SERPL: 1.08 {RATIO}
LEUKOCYTE ESTERASE UR QL STRIP: NEGATIVE
LYMPHOCYTES # BLD AUTO: 1.53 10*3/MM3 (ref 0.7–3.1)
LYMPHOCYTES NFR BLD AUTO: 20.4 % (ref 19.6–45.3)
MCH RBC QN AUTO: 30.8 PG (ref 26.6–33)
MCHC RBC AUTO-ENTMCNC: 33.1 G/DL (ref 31.5–35.7)
MCV RBC AUTO: 93.1 FL (ref 79–97)
MICRO URNS: NORMAL
MICRO URNS: NORMAL
MONOCYTES # BLD AUTO: 0.56 10*3/MM3 (ref 0.1–0.9)
MONOCYTES NFR BLD AUTO: 7.5 % (ref 5–12)
MUCOUS THREADS URNS QL MICRO: PRESENT /HPF
NEUTROPHILS # BLD AUTO: 5.2 10*3/MM3 (ref 1.7–7)
NEUTROPHILS NFR BLD AUTO: 69.4 % (ref 42.7–76)
NITRITE UR QL STRIP: NEGATIVE
NRBC BLD AUTO-RTO: 0 /100 WBC (ref 0–0.2)
PH UR STRIP: 6 [PH] (ref 5–7.5)
PLATELET # BLD AUTO: 239 10*3/MM3 (ref 140–450)
POTASSIUM SERPL-SCNC: 3.8 MMOL/L (ref 3.5–5.2)
PROT SERPL-MCNC: 6 G/DL (ref 6–8.5)
PROT UR QL STRIP: NEGATIVE
RBC # BLD AUTO: 4.51 10*6/MM3 (ref 3.77–5.28)
RBC #/AREA URNS HPF: NORMAL /HPF (ref 0–2)
SODIUM SERPL-SCNC: 140 MMOL/L (ref 136–145)
SP GR UR: 1.01 (ref 1–1.03)
T4 FREE SERPL-MCNC: 1.05 NG/DL (ref 0.93–1.7)
TRIGL SERPL-MCNC: 112 MG/DL (ref 0–150)
TSH SERPL DL<=0.005 MIU/L-ACNC: 0.79 UIU/ML (ref 0.27–4.2)
URINALYSIS REFLEX: NORMAL
UROBILINOGEN UR STRIP-MCNC: 0.2 MG/DL (ref 0.2–1)
VLDLC SERPL CALC-MCNC: 20 MG/DL (ref 5–40)
WBC # BLD AUTO: 7.49 10*3/MM3 (ref 3.4–10.8)
WBC #/AREA URNS HPF: NORMAL /HPF (ref 0–5)

## 2020-11-17 ENCOUNTER — TELEPHONE (OUTPATIENT)
Dept: FAMILY MEDICINE CLINIC | Facility: CLINIC | Age: 45
End: 2020-11-17

## 2020-11-17 ENCOUNTER — OFFICE VISIT (OUTPATIENT)
Dept: FAMILY MEDICINE CLINIC | Facility: CLINIC | Age: 45
End: 2020-11-17

## 2020-11-17 VITALS
HEIGHT: 65 IN | TEMPERATURE: 96.6 F | SYSTOLIC BLOOD PRESSURE: 106 MMHG | BODY MASS INDEX: 23.26 KG/M2 | WEIGHT: 139.6 LBS | RESPIRATION RATE: 16 BRPM | HEART RATE: 66 BPM | DIASTOLIC BLOOD PRESSURE: 68 MMHG | OXYGEN SATURATION: 98 %

## 2020-11-17 DIAGNOSIS — Z00.00 WELL ADULT EXAM: Primary | ICD-10-CM

## 2020-11-17 DIAGNOSIS — Z23 ENCOUNTER FOR IMMUNIZATION: Primary | ICD-10-CM

## 2020-11-17 DIAGNOSIS — Z00.00 WELL ADULT EXAM: ICD-10-CM

## 2020-11-17 PROCEDURE — 90686 IIV4 VACC NO PRSV 0.5 ML IM: CPT | Performed by: INTERNAL MEDICINE

## 2020-11-17 PROCEDURE — 90471 IMMUNIZATION ADMIN: CPT | Performed by: INTERNAL MEDICINE

## 2020-11-17 PROCEDURE — 99396 PREV VISIT EST AGE 40-64: CPT | Performed by: INTERNAL MEDICINE

## 2020-11-17 NOTE — TELEPHONE ENCOUNTER
Added     ----- Message from Ladan Ireland MD sent at 11/17/2020 10:13 AM EST -----  Regarding: add on vit D  Can you please add on Vit D level to her labs due to CPE?

## 2020-11-17 NOTE — PROGRESS NOTES
"Subjective   Sherri Espinoza is a 45 y.o. female who comes in today for No chief complaint on file.  .    History of Present Illness   Here for CPE.  mammo and pap smear are coming up this Thursday.  She has had partial hysterectomy.  MMG was 2 weeks ago.  CN was in 2020 and repeat in 2030.  Needs flu shot.  Anxiety and depression controlled now with wellbutrin XL and lexapro 5 mg qd   she has lost weight over the past year following a low-carb diet.  She is also exercising.  She feels very good.  After the start of Lexapro recently, her anxiety has greatly improved.  She has no sexual side effects and has had no weight gain with it.  The following portions of the patient's history were reviewed and updated as appropriate: allergies, current medications, past family history, past medical history, past social history, past surgical history and problem list.    Review of Systems   Constitutional:        Intentional weight loss by eating low carb.   Psychiatric/Behavioral: Negative.         Anxiety is much better with the lexapro and wellbutrin       /68   Pulse 66   Temp 96.6 °F (35.9 °C) (Temporal)   Resp 16   Ht 166 cm (65.35\")   Wt 63.3 kg (139 lb 9.6 oz)   LMP 02/17/2019 (Approximate)   SpO2 98%   BMI 22.98 kg/m²     STEADI Fall Risk Assessment has not been completed.    PHQ-2/PHQ-9 Depression Screening 11/17/2020   Little interest or pleasure in doing things 0   Feeling down, depressed, or hopeless 0   Total Score 0       Objective   Physical Exam  Vitals signs and nursing note reviewed.   Constitutional:       Appearance: Normal appearance. She is well-developed and normal weight.   HENT:      Head: Normocephalic and atraumatic.      Right Ear: External ear normal.      Left Ear: External ear normal.   Eyes:      Extraocular Movements: Extraocular movements intact.      Conjunctiva/sclera: Conjunctivae normal.   Neck:      Musculoskeletal: Neck supple.      Vascular: No carotid bruit. "   Cardiovascular:      Rate and Rhythm: Normal rate and regular rhythm.      Heart sounds: Normal heart sounds.      Comments: No bruits  Pulmonary:      Effort: Pulmonary effort is normal. No respiratory distress.      Breath sounds: Normal breath sounds. No wheezing or rales.   Abdominal:      General: Bowel sounds are normal. There is no distension.      Palpations: Abdomen is soft. There is no mass.      Tenderness: There is no abdominal tenderness.   Lymphadenopathy:      Cervical: No cervical adenopathy.   Skin:     General: Skin is warm.   Neurological:      General: No focal deficit present.      Mental Status: She is alert and oriented to person, place, and time.   Psychiatric:         Mood and Affect: Mood normal.         Behavior: Behavior normal.         Thought Content: Thought content normal.         Judgment: Judgment normal.           Current Outpatient Medications:   •  buPROPion XL (WELLBUTRIN XL) 150 MG 24 hr tablet, TAKE ONE TABLET BY MOUTH DAILY, Disp: 90 tablet, Rfl: 1  •  cetirizine (ZyrTEC) 10 MG tablet, Take 10 mg by mouth daily., Disp: , Rfl:   •  escitalopram (Lexapro) 5 MG tablet, Take 1 tablet by mouth Daily., Disp: 90 tablet, Rfl: 1  •  hydrOXYzine (ATARAX) 25 MG tablet, Take 1 tablet by mouth 3 (Three) Times a Day As Needed for Anxiety., Disp: 30 tablet, Rfl: 1  •  Prenatal Vit-Fe Fumarate-FA (PRENATAL VITAMIN PO), Take 1 tablet by mouth Daily., Disp: , Rfl:     Assessment/Plan   Diagnoses and all orders for this visit:    1. Encounter for immunization (Primary)  -     Fluarix Quad >6 Months ([id number])    2. Well adult exam      Add on vitamin D level to lab work from last week.  Fasting lab work has been reviewed with patient and looks excellent.  Flu shot today  Continue Lexapro 5 mg daily for anxiety and continue Wellbutrin 150 mg daily for depression.  Both are working very well for her.  She will continue following a low-carb diet and exercising.  I have reviewed her chest CTs  that she was doing to follow-up pulmonary nodule which have remained negative.  She is up-to-date on her mammogram which has been reviewed today as well as her colonoscopy which was negative in February.  She follows up with her gynecologist later this week.  I will see her back in 1 year.             I have asked for the patient to return to clinic in 12month(s).

## 2020-11-18 LAB
25(OH)D3+25(OH)D2 SERPL-MCNC: 40.7 NG/ML (ref 30–100)
Lab: NORMAL
WRITTEN AUTHORIZATION: NORMAL

## 2020-11-22 ENCOUNTER — RESULTS ENCOUNTER (OUTPATIENT)
Dept: FAMILY MEDICINE CLINIC | Facility: CLINIC | Age: 45
End: 2020-11-22

## 2020-11-22 DIAGNOSIS — Z00.00 WELL ADULT EXAM: ICD-10-CM

## 2021-01-02 RX ORDER — BUPROPION HYDROCHLORIDE 150 MG/1
TABLET ORAL
Qty: 90 TABLET | Refills: 0 | Status: SHIPPED | OUTPATIENT
Start: 2021-01-02 | End: 2021-08-17

## 2021-04-06 ENCOUNTER — BULK ORDERING (OUTPATIENT)
Dept: CASE MANAGEMENT | Facility: OTHER | Age: 46
End: 2021-04-06

## 2021-04-06 DIAGNOSIS — Z23 IMMUNIZATION DUE: ICD-10-CM

## 2021-04-20 RX ORDER — ESCITALOPRAM OXALATE 5 MG/1
5 TABLET ORAL DAILY
Qty: 90 TABLET | Refills: 1 | Status: SHIPPED | OUTPATIENT
Start: 2021-04-20 | End: 2021-10-21

## 2021-08-12 DIAGNOSIS — R32 URINARY INCONTINENCE, UNSPECIFIED TYPE: Primary | ICD-10-CM

## 2021-08-17 ENCOUNTER — TELEMEDICINE (OUTPATIENT)
Dept: FAMILY MEDICINE CLINIC | Facility: TELEHEALTH | Age: 46
End: 2021-08-17

## 2021-08-17 DIAGNOSIS — U07.1 COVID-19: Primary | ICD-10-CM

## 2021-08-17 PROCEDURE — 99202 OFFICE O/P NEW SF 15 MIN: CPT | Performed by: NURSE PRACTITIONER

## 2021-08-18 NOTE — PROGRESS NOTES
HPI  Sherri Espinoza is a 45 y.o. female  presents with complaint of exposure to Covid. Took at home test and it was positive. Unsure of what she needed to do now. Denies any symptoms except for a runny nose after the nasal swab for the test. 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    Review of Systems   HENT: Positive for rhinorrhea.    All other systems reviewed and are negative.      Past Medical History:   Diagnosis Date   • Anesthesia complication     VOMITING   • Bicornuate uterus    • Depression     mild   • Gall stones    • PONV (postoperative nausea and vomiting)        Family History   Problem Relation Age of Onset   • No Known Problems Mother    • No Known Problems Father        Social History     Socioeconomic History   • Marital status:      Spouse name: Remy   • Number of children: 2   • Years of education: Some college   • Highest education level: Not on file   Tobacco Use   • Smoking status: Never Smoker   • Smokeless tobacco: Never Used   Substance and Sexual Activity   • Alcohol use: Yes     Comment: Social   • Drug use: No   • Sexual activity: Defer         There were no vitals taken for this visit.    PHYSICAL EXAM  Physical Exam   Constitutional: She appears well-developed and well-nourished.   HENT:   Head: Normocephalic.   Nose: Nose normal.   Neck: Neck normal appearance.  Pulmonary/Chest: Effort normal.   Neurological: She is alert.   Psychiatric: She has a normal mood and affect. Her speech is normal.       Diagnoses and all orders for this visit:    1. COVID-19 (Primary)  -     QUESTIONNAIRE SERIES      *Quarantine for 10 days from positive test.     FOLLOW-UP  As discussed during visit with [facility], if symptoms worsen or fail to improve, follow-up with PCP/Urgent Care/Emergency Department.    Patient verbalizes understanding of medications, instructions for treatment and follow-up.    Jaimee Balderrama, APRN  08/17/2021  21:49  EDT    This visit was performed via Telehealth.  This patient has been instructed to follow-up with their primary care provider if their symptoms worsen or the treatment provided does not resolve their illness.

## 2021-08-18 NOTE — PATIENT INSTRUCTIONS

## 2021-09-09 ENCOUNTER — LAB (OUTPATIENT)
Dept: LAB | Facility: HOSPITAL | Age: 46
End: 2021-09-09

## 2021-09-09 DIAGNOSIS — R32 URINARY INCONTINENCE, UNSPECIFIED TYPE: ICD-10-CM

## 2021-09-09 DIAGNOSIS — R32 URINARY INCONTINENCE, UNSPECIFIED TYPE: Primary | ICD-10-CM

## 2021-09-09 LAB
BACTERIA UR QL AUTO: NORMAL /HPF
BILIRUB UR QL STRIP: NEGATIVE
CLARITY UR: CLEAR
COLOR UR: YELLOW
GLUCOSE UR STRIP-MCNC: NEGATIVE MG/DL
HGB UR QL STRIP.AUTO: ABNORMAL
HYALINE CASTS UR QL AUTO: NORMAL /LPF
KETONES UR QL STRIP: NEGATIVE
LEUKOCYTE ESTERASE UR QL STRIP.AUTO: NEGATIVE
NITRITE UR QL STRIP: NEGATIVE
PH UR STRIP.AUTO: 5.5 [PH] (ref 5–8)
PROT UR QL STRIP: NEGATIVE
RBC # UR: NORMAL /HPF
REF LAB TEST METHOD: NORMAL
SP GR UR STRIP: 1.02 (ref 1–1.03)
SQUAMOUS #/AREA URNS HPF: NORMAL /HPF
UROBILINOGEN UR QL STRIP: ABNORMAL
WBC UR QL AUTO: NORMAL /HPF

## 2021-09-09 PROCEDURE — 81001 URINALYSIS AUTO W/SCOPE: CPT

## 2021-09-10 DIAGNOSIS — R31.29 MICROSCOPIC HEMATURIA: Primary | ICD-10-CM

## 2021-10-15 ENCOUNTER — TELEPHONE (OUTPATIENT)
Dept: FAMILY MEDICINE CLINIC | Facility: CLINIC | Age: 46
End: 2021-10-15

## 2021-10-15 NOTE — TELEPHONE ENCOUNTER
PATIENT IS NEEDING HER REFERRAL SENT TO DR. CHRISSY SANDY'S OFFICE.  THEY ARE REQUESTING THE REFERRAL ORDER TO SET UP AN APPOINTMENT.

## 2021-10-21 RX ORDER — ESCITALOPRAM OXALATE 5 MG/1
TABLET ORAL
Qty: 90 TABLET | Refills: 1 | Status: SHIPPED | OUTPATIENT
Start: 2021-10-21 | End: 2022-04-15

## 2021-10-21 NOTE — TELEPHONE ENCOUNTER
Rx Refill Note  Requested Prescriptions     Pending Prescriptions Disp Refills   • escitalopram (LEXAPRO) 5 MG tablet [Pharmacy Med Name: ESCITALOPRAM 5 MG TABLET] 90 tablet 1     Sig: TAKE ONE TABLET BY MOUTH DAILY      Last office visit with prescribing clinician: 11/17/2020      Next office visit with prescribing clinician: 11/17/2021            Casey Bill MA  10/21/21, 16:56 EDT

## 2021-11-08 DIAGNOSIS — Z00.00 WELL ADULT EXAM: Primary | ICD-10-CM

## 2021-11-13 LAB
ALBUMIN SERPL-MCNC: 4.4 G/DL (ref 3.8–4.8)
ALBUMIN/GLOB SERPL: 2.1 {RATIO} (ref 1.2–2.2)
ALP SERPL-CCNC: 46 IU/L (ref 44–121)
ALT SERPL-CCNC: 31 IU/L (ref 0–32)
APPEARANCE UR: CLEAR
AST SERPL-CCNC: 30 IU/L (ref 0–40)
BACTERIA #/AREA URNS HPF: NORMAL /[HPF]
BASOPHILS # BLD AUTO: 0.1 X10E3/UL (ref 0–0.2)
BASOPHILS NFR BLD AUTO: 1 %
BILIRUB SERPL-MCNC: 0.4 MG/DL (ref 0–1.2)
BILIRUB UR QL STRIP: NEGATIVE
BUN SERPL-MCNC: 18 MG/DL (ref 6–24)
BUN/CREAT SERPL: 25 (ref 9–23)
CALCIUM SERPL-MCNC: 9.5 MG/DL (ref 8.7–10.2)
CASTS URNS QL MICRO: NORMAL /LPF
CHLORIDE SERPL-SCNC: 104 MMOL/L (ref 96–106)
CHOLEST SERPL-MCNC: 167 MG/DL (ref 100–199)
CO2 SERPL-SCNC: 23 MMOL/L (ref 20–29)
COLOR UR: YELLOW
CREAT SERPL-MCNC: 0.73 MG/DL (ref 0.57–1)
EOSINOPHIL # BLD AUTO: 0.3 X10E3/UL (ref 0–0.4)
EOSINOPHIL NFR BLD AUTO: 3 %
EPI CELLS #/AREA URNS HPF: NORMAL /HPF (ref 0–10)
ERYTHROCYTE [DISTWIDTH] IN BLOOD BY AUTOMATED COUNT: 12.5 % (ref 11.7–15.4)
GLOBULIN SER CALC-MCNC: 2.1 G/DL (ref 1.5–4.5)
GLUCOSE SERPL-MCNC: 83 MG/DL (ref 65–99)
GLUCOSE UR QL: NEGATIVE
HCT VFR BLD AUTO: 42.2 % (ref 34–46.6)
HDLC SERPL-MCNC: 65 MG/DL
HGB BLD-MCNC: 14.3 G/DL (ref 11.1–15.9)
HGB UR QL STRIP: NEGATIVE
IMM GRANULOCYTES # BLD AUTO: 0 X10E3/UL (ref 0–0.1)
IMM GRANULOCYTES NFR BLD AUTO: 0 %
KETONES UR QL STRIP: ABNORMAL
LDLC SERPL CALC-MCNC: 88 MG/DL (ref 0–99)
LDLC/HDLC SERPL: 1.4 RATIO (ref 0–3.2)
LEUKOCYTE ESTERASE UR QL STRIP: NEGATIVE
LYMPHOCYTES # BLD AUTO: 1.8 X10E3/UL (ref 0.7–3.1)
LYMPHOCYTES NFR BLD AUTO: 25 %
MCH RBC QN AUTO: 31.3 PG (ref 26.6–33)
MCHC RBC AUTO-ENTMCNC: 33.9 G/DL (ref 31.5–35.7)
MCV RBC AUTO: 92 FL (ref 79–97)
MICRO URNS: ABNORMAL
MICRO URNS: ABNORMAL
MONOCYTES # BLD AUTO: 0.5 X10E3/UL (ref 0.1–0.9)
MONOCYTES NFR BLD AUTO: 6 %
NEUTROPHILS # BLD AUTO: 4.7 X10E3/UL (ref 1.4–7)
NEUTROPHILS NFR BLD AUTO: 65 %
NITRITE UR QL STRIP: NEGATIVE
PH UR STRIP: 5 [PH] (ref 5–7.5)
PLATELET # BLD AUTO: 241 X10E3/UL (ref 150–450)
POTASSIUM SERPL-SCNC: 4 MMOL/L (ref 3.5–5.2)
PROT SERPL-MCNC: 6.5 G/DL (ref 6–8.5)
PROT UR QL STRIP: NEGATIVE
RBC # BLD AUTO: 4.57 X10E6/UL (ref 3.77–5.28)
RBC #/AREA URNS HPF: NORMAL /HPF (ref 0–2)
SODIUM SERPL-SCNC: 140 MMOL/L (ref 134–144)
SP GR UR: 1.03 (ref 1–1.03)
T4 FREE SERPL-MCNC: 0.9 NG/DL (ref 0.82–1.77)
TRIGL SERPL-MCNC: 73 MG/DL (ref 0–149)
TSH SERPL DL<=0.005 MIU/L-ACNC: 1.64 UIU/ML (ref 0.45–4.5)
URINALYSIS REFLEX: ABNORMAL
UROBILINOGEN UR STRIP-MCNC: 0.2 MG/DL (ref 0.2–1)
VLDLC SERPL CALC-MCNC: 14 MG/DL (ref 5–40)
WBC # BLD AUTO: 7.3 X10E3/UL (ref 3.4–10.8)
WBC #/AREA URNS HPF: NORMAL /HPF (ref 0–5)

## 2021-11-17 ENCOUNTER — OFFICE VISIT (OUTPATIENT)
Dept: FAMILY MEDICINE CLINIC | Facility: CLINIC | Age: 46
End: 2021-11-17

## 2021-11-17 VITALS
RESPIRATION RATE: 16 BRPM | DIASTOLIC BLOOD PRESSURE: 62 MMHG | TEMPERATURE: 97.5 F | WEIGHT: 152.8 LBS | HEART RATE: 71 BPM | HEIGHT: 66 IN | SYSTOLIC BLOOD PRESSURE: 110 MMHG | BODY MASS INDEX: 24.56 KG/M2 | OXYGEN SATURATION: 98 %

## 2021-11-17 DIAGNOSIS — R10.31 RIGHT LOWER QUADRANT ABDOMINAL PAIN: ICD-10-CM

## 2021-11-17 DIAGNOSIS — Z00.00 WELL ADULT EXAM: Primary | ICD-10-CM

## 2021-11-17 DIAGNOSIS — Z23 ENCOUNTER FOR IMMUNIZATION: ICD-10-CM

## 2021-11-17 PROCEDURE — 99396 PREV VISIT EST AGE 40-64: CPT | Performed by: INTERNAL MEDICINE

## 2021-11-17 NOTE — PROGRESS NOTES
"Chief Complaint  Annual Exam ( cpe )    Subjective          Sherri Espinoza presents to [facility]  History of Present Illness  Patient is here for her physical.  She sees gyn for pap and MMG and u/s of ovaries in December 21.  She has had a hysterectomy due to bleeding.  She recently saw Dr. Beasley, urology, due to blood in her urine.  The CT scan showed a 1 mm stone, a right ovarian cyst thought to be physiologic but follow-up requested and a probable 5 mm liver hemangioma.  I was able to find in 2016 a gallbladder ultrasound that showed a 1 cm liver hemangioma.  I have asked the patient to get a copy of that ultrasound and take it to her next CT scan which is in March 2022 for comparison purposes.  3/2022 is her f/u CT scan with urology  She is having right lower quadrant pain sharp and comes and goes.  She does have a right ovarian cyst that is being followed in dec 2021 with her gyn.  She is also getting into [facility] for pelvic floor weakness and to help with incontinence..  Her discomfort is actually better with exercising.     lexapro 5 mg qd working well for anxiety control.    Colonoscopy was in 2020 with repeat in 2030  Objective   Vital Signs:   /62   Pulse 71   Temp 97.5 °F (36.4 °C) (Temporal)   Resp 16   Ht 167.6 cm (66\")   Wt 69.3 kg (152 lb 12.8 oz)   SpO2 98%   BMI 24.66 kg/m²     Physical Exam  Vitals and nursing note reviewed.   Constitutional:       Appearance: Normal appearance. She is well-developed.   HENT:      Head: Normocephalic and atraumatic.      Right Ear: External ear normal.      Left Ear: External ear normal.   Eyes:      Extraocular Movements: Extraocular movements intact.      Conjunctiva/sclera: Conjunctivae normal.   Neck:      Vascular: No carotid bruit.   Cardiovascular:      Rate and Rhythm: Normal rate and regular rhythm.      Heart sounds: Normal heart sounds.      Comments: No bruits  Pulmonary:      Effort: Pulmonary effort " is normal. No respiratory distress.      Breath sounds: Normal breath sounds. No wheezing or rales.   Abdominal:      General: Bowel sounds are normal. There is no distension.      Palpations: Abdomen is soft. There is no mass.      Tenderness: There is no abdominal tenderness.   Musculoskeletal:         General: Normal range of motion.      Cervical back: Neck supple.      Right lower leg: No edema.      Left lower leg: No edema.   Lymphadenopathy:      Cervical: No cervical adenopathy.   Skin:     General: Skin is warm.   Neurological:      General: No focal deficit present.      Mental Status: She is alert and oriented to person, place, and time.   Psychiatric:         Mood and Affect: Mood normal.         Behavior: Behavior normal.         Thought Content: Thought content normal.         Judgment: Judgment normal.        Result Review :                SCANNED - IMAGING (11/02/2021)    Microscopic Examination - (11/12/2021 09:07)  CBC & Differential (11/12/2021 09:07)  Comprehensive Metabolic Panel (11/12/2021 09:07)  Lipid Panel With LDL / HDL Ratio (11/12/2021 09:07)  TSH (11/12/2021 09:07)  T4, Free (11/12/2021 09:07)  Urinalysis With Culture If Indicated - Urine, Clean Catch (11/12/2021 09:07)    Assessment and Plan    Diagnoses and all orders for this visit:    1. Well adult exam (Primary)    2. Encounter for immunization  -     FluLaval/Fluarix/Fluzone >6 Months ([id number])    3. Right lower quadrant abdominal pain        Follow Up   Return in about 1 year (around 11/17/2022).  Patient was given instructions and counseling regarding her condition or for health maintenance advice. Please see specific information pulled into the AVS if appropriate.     Etiology of right lower quadrant pain undetermined.  The good news is her urinalysis today did not show any blood in it.  She has good follow-up scheduled with her gynecologist in a few weeks to repeat the ultrasound of her ovaries and then also with her  urologist in March 2022 to repeat the CT scan.  I feel confident that the area in the liver is hemangioma based on the gallbladder ultrasound in 2016 that showed the same.  Actually the size is less on the CT scan.  Her labs have been reviewed with patient and look good.  Her Pap and mammogram are up-to-date.  We will give her a flu shot today.  Anxiety well controlled on the Lexapro.

## 2021-11-19 PROCEDURE — 90686 IIV4 VACC NO PRSV 0.5 ML IM: CPT | Performed by: INTERNAL MEDICINE

## 2021-11-19 PROCEDURE — 90471 IMMUNIZATION ADMIN: CPT | Performed by: INTERNAL MEDICINE

## 2021-12-22 ENCOUNTER — IMMUNIZATION (OUTPATIENT)
Dept: VACCINE CLINIC | Facility: HOSPITAL | Age: 46
End: 2021-12-22

## 2021-12-22 PROCEDURE — 0004A HC ADM SARSCOV2 30MCG/0.3ML BOOSTER: CPT | Performed by: INTERNAL MEDICINE

## 2021-12-22 PROCEDURE — 91300 HC SARSCOV02 VAC 30MCG/0.3ML IM: CPT | Performed by: INTERNAL MEDICINE

## 2022-04-15 RX ORDER — ESCITALOPRAM OXALATE 5 MG/1
TABLET ORAL
Qty: 90 TABLET | Refills: 1 | Status: SHIPPED | OUTPATIENT
Start: 2022-04-15 | End: 2022-10-17

## 2022-04-15 NOTE — TELEPHONE ENCOUNTER
Rx Refill Note  Requested Prescriptions     Pending Prescriptions Disp Refills   • escitalopram (LEXAPRO) 5 MG tablet [Pharmacy Med Name: ESCITALOPRAM 5 MG TABLET] 90 tablet 1     Sig: TAKE ONE TABLET BY MOUTH DAILY      Last office visit with prescribing clinician: 11/17/2021      Next office visit with prescribing clinician: 12/15/2022            Casey Bill MA  04/15/22, 10:45 EDT

## 2022-10-17 RX ORDER — ESCITALOPRAM OXALATE 5 MG/1
TABLET ORAL
Qty: 90 TABLET | Refills: 1 | Status: SHIPPED | OUTPATIENT
Start: 2022-10-17

## 2022-10-17 NOTE — TELEPHONE ENCOUNTER
Rx Refill Note  Requested Prescriptions     Pending Prescriptions Disp Refills   • escitalopram (LEXAPRO) 5 MG tablet [Pharmacy Med Name: ESCITALOPRAM 5 MG TABLET] 90 tablet 1     Sig: TAKE ONE TABLET BY MOUTH DAILY      Last office visit with prescribing clinician: 11/17/2021      Next office visit with prescribing clinician: 12/15/2022            Casey Bill MA  10/17/22, 10:08 EDT

## 2022-11-03 ENCOUNTER — TELEPHONE (OUTPATIENT)
Dept: FAMILY MEDICINE CLINIC | Facility: CLINIC | Age: 47
End: 2022-11-03

## 2022-11-03 DIAGNOSIS — Z00.00 WELL ADULT EXAM: Primary | ICD-10-CM

## 2022-12-15 ENCOUNTER — OFFICE VISIT (OUTPATIENT)
Dept: FAMILY MEDICINE CLINIC | Facility: CLINIC | Age: 47
End: 2022-12-15

## 2022-12-15 VITALS
WEIGHT: 163.8 LBS | SYSTOLIC BLOOD PRESSURE: 108 MMHG | RESPIRATION RATE: 16 BRPM | BODY MASS INDEX: 27.29 KG/M2 | OXYGEN SATURATION: 100 % | TEMPERATURE: 96.6 F | DIASTOLIC BLOOD PRESSURE: 62 MMHG | HEIGHT: 65 IN | HEART RATE: 62 BPM

## 2022-12-15 DIAGNOSIS — Z00.00 WELL ADULT EXAM: ICD-10-CM

## 2022-12-15 DIAGNOSIS — Z23 ENCOUNTER FOR IMMUNIZATION: Primary | ICD-10-CM

## 2022-12-15 PROCEDURE — 90471 IMMUNIZATION ADMIN: CPT | Performed by: INTERNAL MEDICINE

## 2022-12-15 PROCEDURE — 90686 IIV4 VACC NO PRSV 0.5 ML IM: CPT | Performed by: INTERNAL MEDICINE

## 2022-12-15 PROCEDURE — 99396 PREV VISIT EST AGE 40-64: CPT | Performed by: INTERNAL MEDICINE

## 2022-12-15 NOTE — PROGRESS NOTES
"Chief Complaint  Annual Exam    Subjective        Sherri Espinoza presents to [facility]  History of Present Illness  Here for CPE.  Gyn is Dr. leila Little and MMG and pap smear are up to date.  CN 2020 and repeat in 2030.  She would like a flu shot.  Sleeping well for most part.  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  No complaints today.  Doing well.    Objective   Vital Signs:  /62 (BP Location: Left arm, Patient Position: Sitting, Cuff Size: Small Adult)   Pulse 62   Temp 96.6 °F (35.9 °C) (Temporal)   Resp 16   Ht 165.1 cm (65\")   Wt 74.3 kg (163 lb 12.8 oz)   SpO2 100%   BMI 27.26 kg/m²   Estimated body mass index is 27.26 kg/m² as calculated from the following:    Height as of this encounter: 165.1 cm (65\").    Weight as of this encounter: 74.3 kg (163 lb 12.8 oz).        CBC & Differential (12/08/2022 08:19)  Comprehensive Metabolic Panel (12/08/2022 08:19)  Lipid Panel With LDL / HDL Ratio (12/08/2022 08:19)  TSH (12/08/2022 08:19)  T4, Free (12/08/2022 08:19)  Urinalysis With Culture If Indicated - Urine, Clean Catch (12/08/2022 08:19)  Microscopic Examination - (12/08/2022 08:19)    Physical Exam  Vitals and nursing note reviewed.   Constitutional:       Appearance: Normal appearance. She is well-developed.   HENT:      Head: Normocephalic and atraumatic.      Right Ear: External ear normal.      Left Ear: External ear normal.   Eyes:      Extraocular Movements: Extraocular movements intact.      Conjunctiva/sclera: Conjunctivae normal.   Neck:      Vascular: No carotid bruit.   Cardiovascular:      Rate and Rhythm: Normal rate and regular rhythm.      Heart sounds: Normal heart sounds.      Comments: No bruits  Pulmonary:      Effort: Pulmonary effort is normal. No respiratory distress.      Breath sounds: Normal breath sounds. No wheezing or rales.   Abdominal:      General: Bowel sounds are normal. " There is no distension.      Palpations: Abdomen is soft. There is no mass.      Tenderness: There is no abdominal tenderness.   Musculoskeletal:      Cervical back: Neck supple.   Lymphadenopathy:      Cervical: No cervical adenopathy.   Skin:     General: Skin is warm.   Neurological:      General: No focal deficit present.      Mental Status: She is alert and oriented to person, place, and time.   Psychiatric:         Mood and Affect: Mood normal.         Behavior: Behavior normal.         Thought Content: Thought content normal.         Judgment: Judgment normal.        Result Review :                Assessment and Plan   Diagnoses and all orders for this visit:    1. Encounter for immunization (Primary)  -     FluLaval/Fluzone >6 mos ([id number])    2. Well adult exam  -     CBC & Differential; Future  -     Comprehensive Metabolic Panel; Future  -     Lipid Panel With LDL / HDL Ratio; Future  -     TSH; Future  -     T4, Free; Future  -     Urinalysis With Culture If Indicated -; Future    flu shot today  Discussed labs which looked good.  One LFT 1 point elevated which could be due to exercise or tylenol or alcohol.  Will monitor for now.  Continue exercise and low fat/low carb diet  HM is up to date and vaccinations discussed. She can get the covid booster in a month after the flu shot today         Follow Up   Return in about 1 year (around 12/15/2023).  Patient was given instructions and counseling regarding her condition or for health maintenance advice. Please see specific information pulled into the AVS if appropriate.

## 2023-04-18 RX ORDER — ESCITALOPRAM OXALATE 5 MG/1
TABLET ORAL
Qty: 90 TABLET | Refills: 1 | Status: SHIPPED | OUTPATIENT
Start: 2023-04-18

## 2023-04-18 NOTE — TELEPHONE ENCOUNTER
Rx Refill Note  Requested Prescriptions     Pending Prescriptions Disp Refills   • escitalopram (LEXAPRO) 5 MG tablet [Pharmacy Med Name: ESCITALOPRAM 5 MG TABLET] 90 tablet 1     Sig: TAKE ONE TABLET BY MOUTH DAILY      Last office visit with prescribing clinician: 12/15/2022   Last telemedicine visit with prescribing clinician: Visit date not found   Next office visit with prescribing clinician: 12/22/2023                         Would you like a call back once the refill request has been completed: [] Yes [] No    If the office needs to give you a call back, can they leave a voicemail: [] Yes [] No    Casey Bill MA  04/18/23, 11:24 EDT

## 2023-09-18 ENCOUNTER — TELEPHONE (OUTPATIENT)
Dept: FAMILY MEDICINE CLINIC | Facility: CLINIC | Age: 48
End: 2023-09-18

## 2023-09-18 NOTE — TELEPHONE ENCOUNTER
"    Caller: Sherri Espinoza \"Gia\"    Relationship to patient: Self    Best call back number: [phone]     Chief complaint: NEEDS TO SCHEDULE LAB APPT TO GO WITH PHYSICAL SCHEDULED FOR 2-8-24    Type of visit: LAB    Requested date:1 WEEK BEFORE PHYSICAL    I  "

## 2023-09-19 NOTE — TELEPHONE ENCOUNTER
ALL LABS IN CHART ARE SET TO  IN DECEMBER OF THIS YEAR. PLEASE ADVISE IF OK TO SCHEDULE FOR THESE LABS OR IF NEW ORDERS NEED TO BE PLACED?

## 2023-09-25 DIAGNOSIS — N91.1 SECONDARY AMENORRHEA: ICD-10-CM

## 2023-09-25 DIAGNOSIS — R63.5 WEIGHT GAIN: Primary | ICD-10-CM

## 2023-10-10 ENCOUNTER — LAB (OUTPATIENT)
Dept: LAB | Facility: HOSPITAL | Age: 48
End: 2023-10-10
Payer: COMMERCIAL

## 2023-10-10 DIAGNOSIS — R63.5 WEIGHT GAIN: ICD-10-CM

## 2023-10-10 DIAGNOSIS — N91.1 SECONDARY AMENORRHEA: ICD-10-CM

## 2023-10-10 LAB
ALBUMIN SERPL-MCNC: 4.5 G/DL (ref 3.5–5.2)
ALBUMIN/GLOB SERPL: 2.1 G/DL
ALP SERPL-CCNC: 48 U/L (ref 39–117)
ALT SERPL W P-5'-P-CCNC: 21 U/L (ref 1–33)
ANION GAP SERPL CALCULATED.3IONS-SCNC: 9.8 MMOL/L (ref 5–15)
AST SERPL-CCNC: 24 U/L (ref 1–32)
BASOPHILS # BLD AUTO: 0.04 10*3/MM3 (ref 0–0.2)
BASOPHILS NFR BLD AUTO: 0.7 % (ref 0–1.5)
BILIRUB SERPL-MCNC: 0.4 MG/DL (ref 0–1.2)
BUN SERPL-MCNC: 13 MG/DL (ref 6–20)
BUN/CREAT SERPL: 17.8 (ref 7–25)
CALCIUM SPEC-SCNC: 9.7 MG/DL (ref 8.6–10.5)
CHLORIDE SERPL-SCNC: 105 MMOL/L (ref 98–107)
CO2 SERPL-SCNC: 25.2 MMOL/L (ref 22–29)
CREAT SERPL-MCNC: 0.73 MG/DL (ref 0.57–1)
DEPRECATED RDW RBC AUTO: 44.6 FL (ref 37–54)
EGFRCR SERPLBLD CKD-EPI 2021: 101 ML/MIN/1.73
EOSINOPHIL # BLD AUTO: 0.11 10*3/MM3 (ref 0–0.4)
EOSINOPHIL NFR BLD AUTO: 1.9 % (ref 0.3–6.2)
ERYTHROCYTE [DISTWIDTH] IN BLOOD BY AUTOMATED COUNT: 13.1 % (ref 12.3–15.4)
ESTRADIOL SERPL HS-MCNC: 234 PG/ML
FSH SERPL-ACNC: 18.3 MIU/ML
GLOBULIN UR ELPH-MCNC: 2.1 GM/DL
GLUCOSE SERPL-MCNC: 84 MG/DL (ref 65–99)
HBA1C MFR BLD: 4.8 % (ref 4.8–5.6)
HCT VFR BLD AUTO: 40.8 % (ref 34–46.6)
HGB BLD-MCNC: 13.5 G/DL (ref 12–15.9)
IMM GRANULOCYTES # BLD AUTO: 0.02 10*3/MM3 (ref 0–0.05)
IMM GRANULOCYTES NFR BLD AUTO: 0.3 % (ref 0–0.5)
LYMPHOCYTES # BLD AUTO: 1.5 10*3/MM3 (ref 0.7–3.1)
LYMPHOCYTES NFR BLD AUTO: 26.2 % (ref 19.6–45.3)
MCH RBC QN AUTO: 30.7 PG (ref 26.6–33)
MCHC RBC AUTO-ENTMCNC: 33.1 G/DL (ref 31.5–35.7)
MCV RBC AUTO: 92.7 FL (ref 79–97)
MONOCYTES # BLD AUTO: 0.49 10*3/MM3 (ref 0.1–0.9)
MONOCYTES NFR BLD AUTO: 8.6 % (ref 5–12)
NEUTROPHILS NFR BLD AUTO: 3.56 10*3/MM3 (ref 1.7–7)
NEUTROPHILS NFR BLD AUTO: 62.3 % (ref 42.7–76)
NRBC BLD AUTO-RTO: 0 /100 WBC (ref 0–0.2)
PLATELET # BLD AUTO: 196 10*3/MM3 (ref 140–450)
PMV BLD AUTO: 10.2 FL (ref 6–12)
POTASSIUM SERPL-SCNC: 3.9 MMOL/L (ref 3.5–5.2)
PROT SERPL-MCNC: 6.6 G/DL (ref 6–8.5)
RBC # BLD AUTO: 4.4 10*6/MM3 (ref 3.77–5.28)
SODIUM SERPL-SCNC: 140 MMOL/L (ref 136–145)
T4 FREE SERPL-MCNC: 0.91 NG/DL (ref 0.93–1.7)
TSH SERPL DL<=0.05 MIU/L-ACNC: 1.18 UIU/ML (ref 0.27–4.2)
WBC NRBC COR # BLD: 5.72 10*3/MM3 (ref 3.4–10.8)

## 2023-10-10 PROCEDURE — 82670 ASSAY OF TOTAL ESTRADIOL: CPT

## 2023-10-10 PROCEDURE — 84439 ASSAY OF FREE THYROXINE: CPT

## 2023-10-10 PROCEDURE — 84443 ASSAY THYROID STIM HORMONE: CPT

## 2023-10-10 PROCEDURE — 80053 COMPREHEN METABOLIC PANEL: CPT

## 2023-10-10 PROCEDURE — 36415 COLL VENOUS BLD VENIPUNCTURE: CPT

## 2023-10-10 PROCEDURE — 83036 HEMOGLOBIN GLYCOSYLATED A1C: CPT

## 2023-10-10 PROCEDURE — 83001 ASSAY OF GONADOTROPIN (FSH): CPT

## 2023-10-10 PROCEDURE — 85025 COMPLETE CBC W/AUTO DIFF WBC: CPT

## 2023-10-16 RX ORDER — ESCITALOPRAM OXALATE 5 MG/1
TABLET ORAL
Qty: 90 TABLET | Refills: 1 | Status: SHIPPED | OUTPATIENT
Start: 2023-10-16

## 2024-01-11 ENCOUNTER — TELEPHONE (OUTPATIENT)
Dept: FAMILY MEDICINE CLINIC | Facility: CLINIC | Age: 49
End: 2024-01-11
Payer: COMMERCIAL

## 2024-01-11 DIAGNOSIS — Z00.00 WELL ADULT EXAM: Primary | ICD-10-CM

## 2024-01-11 NOTE — TELEPHONE ENCOUNTER
Please sign pended order if appropriate    Pt is going to [facility] on [address] today at 4:15 please sign orders

## 2024-01-12 LAB
ALBUMIN SERPL-MCNC: 4.4 G/DL (ref 3.5–5.2)
ALBUMIN/GLOB SERPL: 2.4 G/DL
ALP SERPL-CCNC: 51 U/L (ref 39–117)
ALT SERPL-CCNC: 25 U/L (ref 1–33)
AST SERPL-CCNC: 21 U/L (ref 1–32)
BASOPHILS # BLD AUTO: 0.03 10*3/MM3 (ref 0–0.2)
BASOPHILS NFR BLD AUTO: 0.4 % (ref 0–1.5)
BILIRUB SERPL-MCNC: 0.2 MG/DL (ref 0–1.2)
BUN SERPL-MCNC: 15 MG/DL (ref 6–20)
BUN/CREAT SERPL: 19.5 (ref 7–25)
CALCIUM SERPL-MCNC: 9.8 MG/DL (ref 8.6–10.5)
CHLORIDE SERPL-SCNC: 109 MMOL/L (ref 98–107)
CHOLEST SERPL-MCNC: 185 MG/DL (ref 0–200)
CO2 SERPL-SCNC: 25.8 MMOL/L (ref 22–29)
CREAT SERPL-MCNC: 0.77 MG/DL (ref 0.57–1)
EGFRCR SERPLBLD CKD-EPI 2021: 94.7 ML/MIN/1.73
EOSINOPHIL # BLD AUTO: 0.17 10*3/MM3 (ref 0–0.4)
EOSINOPHIL NFR BLD AUTO: 2.4 % (ref 0.3–6.2)
ERYTHROCYTE [DISTWIDTH] IN BLOOD BY AUTOMATED COUNT: 12.3 % (ref 12.3–15.4)
GLOBULIN SER CALC-MCNC: 1.8 GM/DL
GLUCOSE SERPL-MCNC: 87 MG/DL (ref 65–99)
HCT VFR BLD AUTO: 38.2 % (ref 34–46.6)
HCV IGG SERPL QL IA: NON REACTIVE
HDLC SERPL-MCNC: 69 MG/DL (ref 40–60)
HGB BLD-MCNC: 13 G/DL (ref 12–15.9)
IMM GRANULOCYTES # BLD AUTO: 0.02 10*3/MM3 (ref 0–0.05)
IMM GRANULOCYTES NFR BLD AUTO: 0.3 % (ref 0–0.5)
LDLC SERPL CALC-MCNC: 94 MG/DL (ref 0–100)
LYMPHOCYTES # BLD AUTO: 1.57 10*3/MM3 (ref 0.7–3.1)
LYMPHOCYTES NFR BLD AUTO: 22 % (ref 19.6–45.3)
MCH RBC QN AUTO: 31.2 PG (ref 26.6–33)
MCHC RBC AUTO-ENTMCNC: 34 G/DL (ref 31.5–35.7)
MCV RBC AUTO: 91.6 FL (ref 79–97)
MONOCYTES # BLD AUTO: 0.47 10*3/MM3 (ref 0.1–0.9)
MONOCYTES NFR BLD AUTO: 6.6 % (ref 5–12)
NEUTROPHILS # BLD AUTO: 4.88 10*3/MM3 (ref 1.7–7)
NEUTROPHILS NFR BLD AUTO: 68.3 % (ref 42.7–76)
NRBC BLD AUTO-RTO: 0 /100 WBC (ref 0–0.2)
PLATELET # BLD AUTO: 216 10*3/MM3 (ref 140–450)
POTASSIUM SERPL-SCNC: 4.2 MMOL/L (ref 3.5–5.2)
PROT SERPL-MCNC: 6.2 G/DL (ref 6–8.5)
RBC # BLD AUTO: 4.17 10*6/MM3 (ref 3.77–5.28)
SODIUM SERPL-SCNC: 145 MMOL/L (ref 136–145)
T4 FREE SERPL-MCNC: 0.9 NG/DL (ref 0.93–1.7)
TRIGL SERPL-MCNC: 126 MG/DL (ref 0–150)
TSH SERPL DL<=0.005 MIU/L-ACNC: 1.18 UIU/ML (ref 0.27–4.2)
VLDLC SERPL CALC-MCNC: 22 MG/DL (ref 5–40)
WBC # BLD AUTO: 7.14 10*3/MM3 (ref 3.4–10.8)

## 2024-02-08 ENCOUNTER — OFFICE VISIT (OUTPATIENT)
Dept: FAMILY MEDICINE CLINIC | Facility: CLINIC | Age: 49
End: 2024-02-08
Payer: COMMERCIAL

## 2024-02-08 VITALS
HEART RATE: 67 BPM | BODY MASS INDEX: 27.52 KG/M2 | TEMPERATURE: 97.7 F | OXYGEN SATURATION: 99 % | WEIGHT: 165.2 LBS | DIASTOLIC BLOOD PRESSURE: 82 MMHG | SYSTOLIC BLOOD PRESSURE: 122 MMHG | HEIGHT: 65 IN

## 2024-02-08 DIAGNOSIS — R23.2 HOT FLASHES: ICD-10-CM

## 2024-02-08 DIAGNOSIS — R79.89 ELEVATED SERUM FREE T4 LEVEL: ICD-10-CM

## 2024-02-08 DIAGNOSIS — Z00.00 WELL ADULT EXAM: Primary | ICD-10-CM

## 2024-02-08 DIAGNOSIS — R79.89 ABNORMAL THYROID BLOOD TEST: ICD-10-CM

## 2024-02-08 DIAGNOSIS — R63.5 WEIGHT GAIN: ICD-10-CM

## 2024-02-08 PROCEDURE — 99396 PREV VISIT EST AGE 40-64: CPT | Performed by: INTERNAL MEDICINE

## 2024-02-08 NOTE — PROGRESS NOTES
"Chief Complaint  Annual Exam (Physical Exam )    Subjective        Sherri Espinoza presents to [facility]  History of Present Illness  Here for CPE.  She is doing ok but is having trouble sleeping through the night.  She is restless more than being physically awake.  She feels fatigued around 2 pm and will either nap which makes sleep worse at night or she can sometimes push through it.  Her sister is going through a lot with her adopted children.  Her sister has Hashimoto's thyroiditis which was diagnosed a few years back as an adult.  Patient's free T4 has been slightly low on 2 occasions over the past 6 months.  She has noted weight gain and her skin does appear to be more dry.  She is suffering from hot flashes related to menopause.  She has had a hysterectomy but her ovaries remain.  She is taking the lexapro 5 mg qd which keeps her level and at this time she does not feel the dose increase is needed.  Pap smear is with Dr. Barrera and she also does her MMG.  Those are coming up soon.    CN 2020 and repeat in 2030    Objective   Vital Signs:  /82 (BP Location: Left arm, Patient Position: Sitting, Cuff Size: Adult)   Pulse 67   Temp 97.7 °F (36.5 °C)   Ht 165.1 cm (65\")   Wt 74.9 kg (165 lb 3.2 oz)   SpO2 99%   BMI 27.49 kg/m²   Estimated body mass index is 27.49 kg/m² as calculated from the following:    Height as of this encounter: 165.1 cm (65\").    Weight as of this encounter: 74.9 kg (165 lb 3.2 oz).     Comprehensive Metabolic Panel (01/11/2024 16:13)  Lipid Panel (01/11/2024 16:13)  CBC & Differential (01/11/2024 16:13)  Hepatitis C Antibody (01/11/2024 16:13)  TSH (01/11/2024 16:13)  T4, Free (01/11/2024 16:13)  BMI is >= 25 and <30. (Overweight) The following options were offered after discussion;: weight loss educational material (shared in after visit summary), exercise counseling/recommendations, and nutrition counseling/recommendations      Physical " Exam  Vitals and nursing note reviewed.   Constitutional:       Appearance: Normal appearance. She is well-developed.   HENT:      Head: Normocephalic and atraumatic.      Right Ear: Tympanic membrane, ear canal and external ear normal.      Left Ear: Tympanic membrane, ear canal and external ear normal.      Mouth/Throat:      Mouth: Mucous membranes are moist.   Eyes:      Extraocular Movements: Extraocular movements intact.      Conjunctiva/sclera: Conjunctivae normal.   Neck:      Vascular: No carotid bruit.   Cardiovascular:      Rate and Rhythm: Normal rate and regular rhythm.      Heart sounds: Normal heart sounds.      Comments: No bruits  Pulmonary:      Effort: Pulmonary effort is normal. No respiratory distress.      Breath sounds: Normal breath sounds. No stridor. No wheezing, rhonchi or rales.   Chest:      Chest wall: No tenderness.   Abdominal:      General: Bowel sounds are normal. There is no distension.      Palpations: Abdomen is soft. There is no mass.      Tenderness: There is no abdominal tenderness. There is no guarding or rebound.      Hernia: No hernia is present.   Musculoskeletal:      Cervical back: Neck supple.      Right lower leg: No edema.      Left lower leg: No edema.   Lymphadenopathy:      Cervical: No cervical adenopathy.   Skin:     General: Skin is warm.   Neurological:      General: No focal deficit present.      Mental Status: She is alert and oriented to person, place, and time. Mental status is at baseline.   Psychiatric:         Mood and Affect: Mood normal.         Behavior: Behavior normal.         Thought Content: Thought content normal.         Judgment: Judgment normal.        Result Review :                     Assessment and Plan     Diagnoses and all orders for this visit:    1. Well adult exam (Primary)    2. Hot flashes  -     T4, Free; Future  -     TSH; Future  -     US Thyroid; Future  -     Thyroid Peroxidase Antibody; Future  -     Anti-Thyroglobulin  Antibody; Future  -     Thyroid Antibodies; Future    3. Weight gain  -     T4, Free; Future  -     TSH; Future  -     US Thyroid; Future  -     Thyroid Peroxidase Antibody; Future  -     Anti-Thyroglobulin Antibody; Future  -     Thyroid Antibodies; Future    4. Elevated serum free T4 level    5. Abnormal thyroid blood test  -     T4, Free; Future  -     TSH; Future  -     US Thyroid; Future  -     Thyroid Peroxidase Antibody; Future  -     Anti-Thyroglobulin Antibody; Future  -     Thyroid Antibodies; Future    With patient's recent weight gain over the past couple years and her sisters history of Hashimoto's thyroiditis as well as the fact that patient has slightly low free T4, will start a thyroid workup.  Will repeat her thyroid studies in 1 month.  Will check thyroid antibodies as well as a thyroid ultrasound.  Will treat with Levoxyl if any of those parameters are positive.  Hot flashes were also discussed.  This is in line with menopause or perimenopause.  She has tried some over-the-counter supplements without much benefit.  Pap smear and mammogram are done with her gynecologist.  We did discuss the importance of healthy eating and exercise which she is already doing.  We talked about the importance of tracking calories and portion control to help with weight loss efforts.  We discussed minimizing calories through liquids such as alcohol, sweet drinks etc.  She already is cautious about that.  We also discussed sleep hygiene.  We discussed the benefits of a set bedtime and wake time.  We discussed that napping is not helpful in the long run with sleep disturbances.  We discussed the importance of keeping technology/screens away from her bedtime and sleep time.  She would like to work on sleep hygiene first.  If after a month those measures do not make an improvement in her overall sleep quality, she may be a candidate for as needed trazodone.  She will message me and let me know.  We did review her blood  work today which was normal other than a slight decrease in free T4.         Follow Up     No follow-ups on file.  Patient was given instructions and counseling regarding her condition or for health maintenance advice. Please see specific information pulled into the AVS if appropriate.

## 2024-02-21 ENCOUNTER — HOSPITAL ENCOUNTER (OUTPATIENT)
Facility: HOSPITAL | Age: 49
Discharge: HOME | End: 2024-02-21
Admitting: INTERNAL MEDICINE
Payer: COMMERCIAL

## 2024-02-21 DIAGNOSIS — R63.5 WEIGHT GAIN: ICD-10-CM

## 2024-02-21 DIAGNOSIS — R79.89 ABNORMAL THYROID BLOOD TEST: ICD-10-CM

## 2024-02-21 DIAGNOSIS — R23.2 HOT FLASHES: ICD-10-CM

## 2024-02-21 PROCEDURE — 76536 US EXAM OF HEAD AND NECK: CPT

## 2024-03-05 ENCOUNTER — TELEPHONE (OUTPATIENT)
Dept: FAMILY MEDICINE CLINIC | Facility: CLINIC | Age: 49
End: 2024-03-05

## 2024-03-05 DIAGNOSIS — R79.89 ABNORMAL THYROID BLOOD TEST: ICD-10-CM

## 2024-03-05 DIAGNOSIS — R63.5 WEIGHT GAIN: ICD-10-CM

## 2024-03-05 DIAGNOSIS — R23.2 HOT FLASHES: ICD-10-CM

## 2024-03-05 NOTE — TELEPHONE ENCOUNTER
"Hub staff attempted to follow warm transfer process and was unsuccessful     Caller: Sherri Espinoza \"Gia\"    Relationship to patient: Self    Best call back number: [phone]    Patient is needing: PATIENT STATED SHE IS CURRENTLY AT [facility], AND THEY ADVISED THEY DO NOT HAVE ORDERS FROM DR WILKINSON.    FAX NUMBER- [phone]    PLEASE CALL.  "

## 2024-03-06 LAB
T4 FREE SERPL-MCNC: 0.8 NG/DL (ref 0.82–1.77)
THYROGLOB AB SERPL-ACNC: <1 IU/ML (ref 0–0.9)
THYROPEROXIDASE AB SERPL-ACNC: 11 IU/ML (ref 0–34)
TSH SERPL DL<=0.005 MIU/L-ACNC: 1.04 UIU/ML (ref 0.45–4.5)

## 2024-03-07 DIAGNOSIS — R79.89 ABNORMAL THYROID BLOOD TEST: Primary | ICD-10-CM

## 2024-03-07 DIAGNOSIS — E03.9 HYPOTHYROIDISM, UNSPECIFIED TYPE: ICD-10-CM

## 2024-03-07 RX ORDER — LEVOTHYROXINE SODIUM 0.03 MG/1
25 TABLET ORAL
Qty: 90 TABLET | Refills: 0 | Status: SHIPPED | OUTPATIENT
Start: 2024-03-07

## 2024-05-13 RX ORDER — ESCITALOPRAM OXALATE 5 MG/1
5 TABLET ORAL DAILY
Qty: 90 TABLET | Refills: 1 | Status: SHIPPED | OUTPATIENT
Start: 2024-05-13

## 2024-05-31 DIAGNOSIS — E03.9 HYPOTHYROIDISM, UNSPECIFIED TYPE: ICD-10-CM

## 2024-05-31 DIAGNOSIS — R79.89 ABNORMAL THYROID BLOOD TEST: ICD-10-CM

## 2024-06-02 DIAGNOSIS — R79.89 ABNORMAL THYROID BLOOD TEST: ICD-10-CM

## 2024-06-02 DIAGNOSIS — E03.9 HYPOTHYROIDISM, UNSPECIFIED TYPE: ICD-10-CM

## 2024-06-03 RX ORDER — LEVOTHYROXINE SODIUM 0.03 MG/1
25 TABLET ORAL
Qty: 90 TABLET | Refills: 0 | Status: SHIPPED | OUTPATIENT
Start: 2024-06-03

## 2024-06-03 NOTE — TELEPHONE ENCOUNTER
Rx Refill Note  Requested Prescriptions     Pending Prescriptions Disp Refills    levothyroxine (SYNTHROID, LEVOTHROID) 25 MCG tablet [Pharmacy Med Name: LEVOTHYROXINE 25 MCG TABLET] 90 tablet 0     Sig: TAKE 1 TABLET BY MOUTH EVERY MORNING      Last office visit with prescribing clinician: 2/8/2024   Last telemedicine visit with prescribing clinician: Visit date not found   Next office visit with prescribing clinician: 6/2/2024                         Would you like a call back once the refill request has been completed: [] Yes [] No    If the office needs to give you a call back, can they leave a voicemail: [] Yes [] No    Donavon Tellez MA  06/03/24, 08:16 EDT

## 2024-06-04 RX ORDER — LEVOTHYROXINE SODIUM 0.03 MG/1
25 TABLET ORAL
Qty: 90 TABLET | Refills: 0 | Status: SHIPPED | OUTPATIENT
Start: 2024-06-04

## 2024-08-29 DIAGNOSIS — E03.9 HYPOTHYROIDISM, UNSPECIFIED TYPE: ICD-10-CM

## 2024-08-29 DIAGNOSIS — R79.89 ABNORMAL THYROID BLOOD TEST: ICD-10-CM

## 2024-08-29 RX ORDER — LEVOTHYROXINE SODIUM 25 UG/1
25 TABLET ORAL
Qty: 90 TABLET | Refills: 0 | Status: SHIPPED | OUTPATIENT
Start: 2024-08-29

## 2024-12-02 RX ORDER — ESCITALOPRAM OXALATE 5 MG/1
5 TABLET ORAL DAILY
Qty: 90 TABLET | Refills: 1 | Status: SHIPPED | OUTPATIENT
Start: 2024-12-02

## 2024-12-02 NOTE — TELEPHONE ENCOUNTER
Rx Refill Note  Requested Prescriptions     Pending Prescriptions Disp Refills    escitalopram (LEXAPRO) 5 MG tablet [Pharmacy Med Name: ESCITALOPRAM 5 MG TABLET] 90 tablet 1     Sig: TAKE 1 TABLET BY MOUTH DAILY      Last office visit with prescribing clinician: 2/8/2024   Last telemedicine visit with prescribing clinician: Visit date not found   Next office visit with prescribing clinician: 11/29/2024                         Would you like a call back once the refill request has been completed: [] Yes [] No    If the office needs to give you a call back, can they leave a voicemail: [] Yes [] No    Hiren Brooke MA  12/02/24, 09:27 EST

## 2025-01-02 DIAGNOSIS — I10 HYPERTENSION, UNSPECIFIED TYPE: Primary | ICD-10-CM

## 2025-01-02 DIAGNOSIS — R23.2 HOT FLASHES: ICD-10-CM

## 2025-01-02 DIAGNOSIS — R63.5 WEIGHT GAIN: ICD-10-CM

## 2025-01-02 DIAGNOSIS — E03.9 HYPOTHYROIDISM, UNSPECIFIED TYPE: ICD-10-CM

## 2025-01-10 ENCOUNTER — TELEPHONE (OUTPATIENT)
Dept: FAMILY MEDICINE CLINIC | Facility: CLINIC | Age: 50
End: 2025-01-10

## 2025-01-10 NOTE — TELEPHONE ENCOUNTER
"      Hub staff attempted to follow warm transfer process and was unsuccessful     Caller: Sherri Espinoza \"Gia\"    Relationship to patient: Self    Best call back number: [phone]     Patient is needing: PATIENT IS REQUESTING THAT HER LAB ORDERS BE SENT TO THE [facility] IN [facility]    PHONE NUMBER [phone]    PLEASE CALL PATIENT ONCE THESE HAVE BEEN SENT OVER PLEASE.        "

## 2025-01-14 NOTE — TELEPHONE ENCOUNTER
Hub to really:   LVM for pt to call back to see if she still needs labs sent to [facility] [place]

## 2025-01-15 DIAGNOSIS — E03.9 HYPOTHYROIDISM, UNSPECIFIED TYPE: ICD-10-CM

## 2025-01-15 DIAGNOSIS — R23.2 HOT FLASHES: ICD-10-CM

## 2025-01-15 DIAGNOSIS — R63.5 WEIGHT GAIN: ICD-10-CM

## 2025-01-16 LAB
BASOPHILS # BLD AUTO: 0.03 10*3/MM3 (ref 0–0.2)
BASOPHILS NFR BLD AUTO: 0.4 % (ref 0–1.5)
BUN SERPL-MCNC: 23 MG/DL (ref 6–20)
BUN/CREAT SERPL: 29.9 (ref 7–25)
CALCIUM SERPL-MCNC: 9.6 MG/DL (ref 8.6–10.5)
CHLORIDE SERPL-SCNC: 103 MMOL/L (ref 98–107)
CO2 SERPL-SCNC: 27 MMOL/L (ref 22–29)
CREAT SERPL-MCNC: 0.77 MG/DL (ref 0.57–1)
EGFRCR SERPLBLD CKD-EPI 2021: 94.1 ML/MIN/1.73
EOSINOPHIL # BLD AUTO: 0.21 10*3/MM3 (ref 0–0.4)
EOSINOPHIL NFR BLD AUTO: 2.7 % (ref 0.3–6.2)
ERYTHROCYTE [DISTWIDTH] IN BLOOD BY AUTOMATED COUNT: 12 % (ref 12.3–15.4)
ESTRADIOL SERPL-MCNC: <5 PG/ML
FSH SERPL-ACNC: 107 MIU/ML
GLUCOSE SERPL-MCNC: 67 MG/DL (ref 65–99)
HCT VFR BLD AUTO: 41.9 % (ref 34–46.6)
HGB BLD-MCNC: 14.4 G/DL (ref 12–15.9)
IMM GRANULOCYTES # BLD AUTO: 0.02 10*3/MM3 (ref 0–0.05)
IMM GRANULOCYTES NFR BLD AUTO: 0.3 % (ref 0–0.5)
LYMPHOCYTES # BLD AUTO: 1.44 10*3/MM3 (ref 0.7–3.1)
LYMPHOCYTES NFR BLD AUTO: 18.8 % (ref 19.6–45.3)
MCH RBC QN AUTO: 31.2 PG (ref 26.6–33)
MCHC RBC AUTO-ENTMCNC: 34.4 G/DL (ref 31.5–35.7)
MCV RBC AUTO: 90.9 FL (ref 79–97)
MONOCYTES # BLD AUTO: 0.55 10*3/MM3 (ref 0.1–0.9)
MONOCYTES NFR BLD AUTO: 7.2 % (ref 5–12)
NEUTROPHILS # BLD AUTO: 5.41 10*3/MM3 (ref 1.7–7)
NEUTROPHILS NFR BLD AUTO: 70.6 % (ref 42.7–76)
NRBC BLD AUTO-RTO: 0 /100 WBC (ref 0–0.2)
PLATELET # BLD AUTO: 252 10*3/MM3 (ref 140–450)
POTASSIUM SERPL-SCNC: 4.3 MMOL/L (ref 3.5–5.2)
RBC # BLD AUTO: 4.61 10*6/MM3 (ref 3.77–5.28)
SODIUM SERPL-SCNC: 143 MMOL/L (ref 136–145)
T4 FREE SERPL-MCNC: 0.95 NG/DL (ref 0.92–1.68)
TSH SERPL DL<=0.005 MIU/L-ACNC: 0.56 UIU/ML (ref 0.27–4.2)
WBC # BLD AUTO: 7.66 10*3/MM3 (ref 3.4–10.8)

## 2025-01-28 ENCOUNTER — OFFICE VISIT (OUTPATIENT)
Dept: FAMILY MEDICINE CLINIC | Facility: CLINIC | Age: 50
End: 2025-01-28
Payer: COMMERCIAL

## 2025-01-28 VITALS
BODY MASS INDEX: 29.06 KG/M2 | DIASTOLIC BLOOD PRESSURE: 74 MMHG | SYSTOLIC BLOOD PRESSURE: 118 MMHG | HEART RATE: 67 BPM | HEIGHT: 65 IN | OXYGEN SATURATION: 99 % | WEIGHT: 174.4 LBS

## 2025-01-28 DIAGNOSIS — E04.2 MULTIPLE THYROID NODULES: Primary | ICD-10-CM

## 2025-01-28 PROCEDURE — 99214 OFFICE O/P EST MOD 30 MIN: CPT | Performed by: INTERNAL MEDICINE

## 2025-01-28 RX ORDER — PAROXETINE 10 MG/1
10 TABLET, FILM COATED ORAL DAILY
COMMUNITY
Start: 2023-04-07

## 2025-01-28 NOTE — PROGRESS NOTES
Chief Complaint  Follow-up (Discuss lab/)    Patient or patient representative verbalized consent for the use of Ambient Listening during the visit with  Ladan Ireland MD for chart documentation. 1/28/2025  08:39 EST    Subjective        Sherri Espinoza presents to [facility]    History of Present Illness  The patient is a 49-year-old female who presents for evaluation of hot flashes, weight gain, and elevated BUN on recent labs last week which were done in mid afternoon.    She has been experiencing hot flashes for several years, which she attributes to her menopausal status.  She has had a hysterectomy. She has a history of blood clots years ago while on OCP and was advised against hormone therapy by her gynecologist, Dr. Little. She has been on Paxil for over 2 years, prescribed by her gynecologist for hot flashes, and takes it at night. She has never been on Effexor before. She has been taking Estroven otc. She reports minimal alcohol consumption.    She has observed weight gain since her last visit, despite maintaining an active lifestyle that includes weightlifting, walking, and other forms of exercise. She adheres to a ketogenic diet and has significantly reduced her intake of teas and soft drinks, primarily consuming water. She recalls that her lab work was done in the afternoon and she had already worked out and had over 64 ounces of water. She was informed of potential dehydration based on her lab results with elevated BUN and sodium of 143    She has been on Lexapro for several years due to anxiety, which she resumed around the onset of the COVID-19 pandemic. She was on it when she lost a lot of weight in the past with diet and exercise.    Supplemental Information  She has a mammogram scheduled in February 2025. She is under the care of Dr. Bernadine Little, gynecologist, with a follow-up appointment scheduled for June 2025.    SOCIAL HISTORY  She reports minimal  "alcohol consumption.    MEDICATIONS  Current: Levothyroxine, Lexapro, Paxil      Objective   Vital Signs:  /74 (BP Location: Right arm, Patient Position: Sitting, Cuff Size: Adult)   Pulse 67   Ht 165.1 cm (65\")   Wt 79.1 kg (174 lb 6.4 oz)   SpO2 99%   BMI 29.02 kg/m²   Estimated body mass index is 29.02 kg/m² as calculated from the following:    Height as of this encounter: 165.1 cm (65\").    Weight as of this encounter: 79.1 kg (174 lb 6.4 oz).            Physical Exam  Vitals and nursing note reviewed.   Constitutional:       Appearance: Normal appearance. She is well-developed.   HENT:      Head: Normocephalic and atraumatic.      Right Ear: External ear normal.      Left Ear: External ear normal.   Eyes:      Extraocular Movements: Extraocular movements intact.      Conjunctiva/sclera: Conjunctivae normal.   Neck:      Vascular: No carotid bruit.   Cardiovascular:      Rate and Rhythm: Normal rate and regular rhythm.      Heart sounds: Normal heart sounds.      Comments: No bruits  Pulmonary:      Effort: Pulmonary effort is normal. No respiratory distress.      Breath sounds: Normal breath sounds. No stridor. No wheezing, rhonchi or rales.   Chest:      Chest wall: No tenderness.   Abdominal:      General: Bowel sounds are normal. There is no distension.      Palpations: Abdomen is soft. There is no mass.      Tenderness: There is no abdominal tenderness. There is no guarding or rebound.      Hernia: No hernia is present.   Musculoskeletal:      Cervical back: Neck supple.   Lymphadenopathy:      Cervical: No cervical adenopathy.   Skin:     General: Skin is warm.   Neurological:      General: No focal deficit present.      Mental Status: She is alert and oriented to person, place, and time. Mental status is at baseline.   Psychiatric:         Mood and Affect: Mood normal.         Behavior: Behavior normal.         Thought Content: Thought content normal.         Judgment: Judgment normal.      "   Result Review :                   Assessment and Plan   Diagnoses and all orders for this visit:    1. Multiple thyroid nodules (Primary)  -     US Thyroid             Assessment & Plan  1. Menopausal symptoms.  Her estradiol levels are low, and FSH levels are high, consistent with menopause. She has been experiencing hot flashes for a couple of years. Given her history of blood clots, hormone therapy is not recommended. Nonhormonal treatment options were discussed, including Effexor 37.5 mg daily, which can help control hot flashes. She was also informed about a new nonhormonal medication for hot flashes called Veozah.  This has the increased risk of LFT elevation and injury.  I have not prescribed it and so I recommend that she discuss this with her gyn.   She was advised to consult with her gynecologist regarding the use of Effexor--she would need to stop paxil and start effexor instead.  I will stop lexapro 5 mg since it is less likely to help with hot flashes and she is already on SSRI.    2. Elevated BUN.  Her BUN levels were elevated, possibly due to dehydration or high protein intake. Sodium was also on the high side at 143. Electrolytes were normal. Bicarbonate was normal. Calcium was normal. Potassium was normal. She was advised to monitor her protein intake and ensure adequate hydration. It was noted that her lab results were taken in the afternoon, and she had likely consumed over 64 ounces of water that day. The possibility of excessive sweating during workouts contributing to elevated BUN was also discussed.    3. Weight gain.  She has gained weight despite maintaining an active lifestyle and following a keto diet. It was discussed that her current medications, including Lexapro and Paxil, could contribute to weight gain. She was advised to discontinue Lexapro 5 mg daily and continue with Paxil, as it may have a better effect on hot flashes. The potential side effects of Effexor, including weight  gain, were also discussed.    4. Thyroid management.  Her thyroid function is within normal limits, with a TSH of 0.55 and free T4 of 0.95. She is currently on levothyroxine 25 mcg daily, which has improved her thyroid levels from when she was previously underactive.  Last thyroid ultrasound was in February 2024 that showed tiny right thyroid nodules and it recommended repeat follow-up ultrasound of the thyroid in 1 to 2 years.  I will go ahead and place that order now.    PROCEDURE  The patient underwent a hysterectomy in the past.         Follow Up   No follow-ups on file.  Patient was given instructions and counseling regarding her condition or for health maintenance advice. Please see specific information pulled into the AVS if appropriate.           Ladan Ireland MD   09:43 EST   [unfilled]       Answers submitted by the patient for this visit:  Problem not listed (Submitted on 1/23/2025)  Chief Complaint: Other medical problem  Reason for appointment: Lab follow up- check  abdominal pain: No  anorexia: No  joint pain: No  change in stool: No  chest pain: No  chills: No  nasal congestion: No  cough: No  diaphoresis: No  fatigue: No  fever: No  headaches: No  joint swelling: No  myalgias: No  nausea: No  neck pain: No  numbness: No  rash: No  sore throat: No  swollen glands: No  dysuria: No  vertigo: No  visual change: No  vomiting: No  weakness: No  Onset: at an unknown time  Chronicity: recurrent  Frequency: daily

## 2025-02-06 ENCOUNTER — HOSPITAL ENCOUNTER (OUTPATIENT)
Facility: HOSPITAL | Age: 50
Discharge: HOME | End: 2025-02-06
Admitting: INTERNAL MEDICINE
Payer: COMMERCIAL

## 2025-02-06 PROCEDURE — 76536 US EXAM OF HEAD AND NECK: CPT

## 2025-02-23 DIAGNOSIS — E03.9 HYPOTHYROIDISM, UNSPECIFIED TYPE: ICD-10-CM

## 2025-02-23 DIAGNOSIS — R79.89 ABNORMAL THYROID BLOOD TEST: ICD-10-CM

## 2025-02-24 RX ORDER — LEVOTHYROXINE SODIUM 25 UG/1
25 TABLET ORAL
Qty: 90 TABLET | Refills: 0 | Status: SHIPPED | OUTPATIENT
Start: 2025-02-24

## 2025-02-25 DIAGNOSIS — E04.2 MULTIPLE THYROID NODULES: Primary | ICD-10-CM

## 2025-04-03 ENCOUNTER — HOSPITAL ENCOUNTER (OUTPATIENT)
Dept: GENERAL RADIOLOGY | Facility: HOSPITAL | Age: 50
Discharge: HOME | End: 2025-04-03
Admitting: FAMILY MEDICINE
Payer: COMMERCIAL

## 2025-04-03 ENCOUNTER — OFFICE VISIT (OUTPATIENT)
Dept: FAMILY MEDICINE CLINIC | Facility: CLINIC | Age: 50
End: 2025-04-03
Payer: COMMERCIAL

## 2025-04-03 VITALS
HEART RATE: 71 BPM | DIASTOLIC BLOOD PRESSURE: 66 MMHG | RESPIRATION RATE: 14 BRPM | HEIGHT: 65 IN | TEMPERATURE: 97.5 F | SYSTOLIC BLOOD PRESSURE: 109 MMHG | BODY MASS INDEX: 29.49 KG/M2 | OXYGEN SATURATION: 98 % | WEIGHT: 177 LBS

## 2025-04-03 DIAGNOSIS — M25.562 ACUTE PAIN OF LEFT KNEE: Primary | ICD-10-CM

## 2025-04-03 DIAGNOSIS — M25.462 KNEE EFFUSION, LEFT: ICD-10-CM

## 2025-04-03 PROCEDURE — 99214 OFFICE O/P EST MOD 30 MIN: CPT | Performed by: FAMILY MEDICINE

## 2025-04-03 PROCEDURE — 73562 X-RAY EXAM OF KNEE 3: CPT

## 2025-04-03 RX ORDER — METHYLPREDNISOLONE 4 MG/1
TABLET ORAL
Qty: 1 EACH | Refills: 0 | Status: SHIPPED | OUTPATIENT
Start: 2025-04-03

## 2025-04-03 RX ORDER — ESCITALOPRAM OXALATE 5 MG/1
5 TABLET ORAL DAILY
COMMUNITY
Start: 2025-03-27

## 2025-04-21 NOTE — PROGRESS NOTES
"Chief Complaint  Knee Pain (Pt states left knee)    Subjective        Sherri Espinoza presents to [facility]  History of Present Illness    History of Present Illness  The patient presents for evaluation of left knee pain.    She reports experiencing pain in her left knee, which she attributes to an incident last Wednesday when her knee locked up and swelled during a routine walk. She has been managing the condition with rest, ice, compression, and elevation (RICE) therapy. The knee has been mildly sensitive for approximately a month, a period during which she was engaged in house remodeling activities that involved frequent kneeling and painting. She describes the current state of her knee as tight, with the most intense pain localized at the back of the knee. Despite the pain, she maintains an active lifestyle, including regular exercise and walking. She believes the pain may be due to overuse. She reports no dizziness or recent falls. She has previously taken steroids without any adverse effects, including sleep disturbances.     Objective   Vital Signs:  /66   Pulse 71   Temp 97.5 °F (36.4 °C) (Infrared)   Resp 14   Ht 165.1 cm (65\")   Wt 80.3 kg (177 lb)   SpO2 98%   BMI 29.45 kg/m²   Estimated body mass index is 29.45 kg/m² as calculated from the following:    Height as of this encounter: 165.1 cm (65\").    Weight as of this encounter: 80.3 kg (177 lb).             Physical Exam   Result Review :                   Assessment and Plan   Diagnoses and all orders for this visit:    1. Knee effusion, left (Primary)  -     XR Knee 3 View Left  -     methylPREDNISolone (MEDROL) 4 MG dose pack; Take as directed on package instructions.  Dispense: 1 each; Refill: 0    2. Acute pain of left knee  -     XR Knee 3 View Left  -     methylPREDNISolone (MEDROL) 4 MG dose pack; Take as directed on package instructions.  Dispense: 1 each; Refill: 0        Assessment & Plan  1. Left " knee pain.  The left knee pain is likely due to bursitis or inflammation, possibly from overuse during house remodeling activities. Fluid accumulation and crepitation were noted, suggesting arthritis. An x-ray of the left knee will be ordered to confirm the diagnosis. A Medrol Dosepak will be prescribed for 5 days. She is advised to continue RICE therapy and use a compression brace during ambulation. If there is no improvement, a referral to orthopedics for fluid drainage will be considered. Post-steroid treatment, ibuprofen or Motrin can be used as needed for pain management. If necessary, a referral to sports medicine for a steroid injection directly into the joint will be made.          Follow Up   There are no Patient Instructions on file for this visit.   No follow-ups on file.  Patient was given instructions and counseling regarding her condition or for health maintenance advice. Please see specific information pulled into the AVS if appropriate.     Patient or patient representative verbalized consent for the use of Ambient Listening during the visit with  Lea Santana MD for chart documentation. 4/21/2025  12:22 EDT

## 2025-05-16 ENCOUNTER — TELEPHONE (OUTPATIENT)
Dept: FAMILY MEDICINE CLINIC | Facility: CLINIC | Age: 50
End: 2025-05-16
Payer: COMMERCIAL

## 2025-05-16 NOTE — TELEPHONE ENCOUNTER
Patient has sent in a [employer] website request for an appointment and stated:  Dr. Ladan Ireland wants to see me as soon as possible to get weight, BMI, and talk about starting a medication for weight loss.     Please advise if she needs to be squeezed in somewhere. TY

## 2025-05-19 NOTE — TELEPHONE ENCOUNTER
Patient stated can make appointment. Can you please ad and override this patient for 05/27/25 at 9 am. Per Beka. (I do not have override authority) TY

## 2025-05-21 ENCOUNTER — TELEPHONE (OUTPATIENT)
Dept: FAMILY MEDICINE CLINIC | Facility: CLINIC | Age: 50
End: 2025-05-21
Payer: COMMERCIAL

## 2025-05-22 NOTE — TELEPHONE ENCOUNTER
Called and spoke to pt she was supposed to be scheduled on 5/27 at 9 am per notes pt confirmed and appt was switched.

## 2025-05-22 NOTE — TELEPHONE ENCOUNTER
When I said she could see me at 9 am on 5/29/25, there was no other appt scheduled at 9 am that day.  Somehow in the scheduling process, I am double booked at 9am.   Therefore, let's move this patient from 9 am to 11:30 am on 5/29/25 please

## 2025-05-23 DIAGNOSIS — E03.9 HYPOTHYROIDISM, UNSPECIFIED TYPE: ICD-10-CM

## 2025-05-23 DIAGNOSIS — R79.89 ABNORMAL THYROID BLOOD TEST: ICD-10-CM

## 2025-05-23 NOTE — TELEPHONE ENCOUNTER
Rx Refill Note  Requested Prescriptions     Pending Prescriptions Disp Refills    levothyroxine (SYNTHROID, LEVOTHROID) 25 MCG tablet 90 tablet 3     Sig: Take 1 tablet by mouth Every Morning.    escitalopram (LEXAPRO) 5 MG tablet 90 tablet 3     Sig: Take 1 tablet by mouth Daily.      Last office visit with prescribing clinician: 1/28/2025   Last telemedicine visit with prescribing clinician: Visit date not found   Next office visit with prescribing clinician: 5/27/2025                         Would you like a call back once the refill request has been completed: [] Yes [] No    If the office needs to give you a call back, can they leave a voicemail: [] Yes [] No    Hiren Brooke MA  05/23/25, 11:50 EDT

## 2025-05-23 NOTE — TELEPHONE ENCOUNTER
Rx Refill Note  Requested Prescriptions     Pending Prescriptions Disp Refills    levothyroxine (SYNTHROID, LEVOTHROID) 25 MCG tablet 90 tablet 3     Sig: Take 1 tablet by mouth Every Morning.    escitalopram (LEXAPRO) 5 MG tablet 90 tablet 3     Sig: Take 1 tablet by mouth Daily.      Last office visit with prescribing clinician: 1/28/2025   Last telemedicine visit with prescribing clinician: Visit date not found   Next office visit with prescribing clinician: 5/27/2025                         Would you like a call back once the refill request has been completed: [] Yes [] No    If the office needs to give you a call back, can they leave a voicemail: [] Yes [] No    Hiren Brooke MA  05/23/25, 11:57 EDT

## 2025-05-24 RX ORDER — LEVOTHYROXINE SODIUM 25 UG/1
25 TABLET ORAL
Qty: 90 TABLET | Refills: 3 | Status: SHIPPED | OUTPATIENT
Start: 2025-05-24

## 2025-05-24 RX ORDER — ESCITALOPRAM OXALATE 5 MG/1
5 TABLET ORAL DAILY
Qty: 90 TABLET | Refills: 3 | Status: SHIPPED | OUTPATIENT
Start: 2025-05-24

## 2025-05-27 ENCOUNTER — OFFICE VISIT (OUTPATIENT)
Dept: FAMILY MEDICINE CLINIC | Facility: CLINIC | Age: 50
End: 2025-05-27
Payer: COMMERCIAL

## 2025-05-27 VITALS
TEMPERATURE: 97.5 F | HEIGHT: 65 IN | HEART RATE: 74 BPM | WEIGHT: 174 LBS | DIASTOLIC BLOOD PRESSURE: 70 MMHG | BODY MASS INDEX: 28.99 KG/M2 | SYSTOLIC BLOOD PRESSURE: 100 MMHG | OXYGEN SATURATION: 98 %

## 2025-05-27 DIAGNOSIS — Z76.89 ENCOUNTER FOR WEIGHT MANAGEMENT: ICD-10-CM

## 2025-05-27 PROCEDURE — 99213 OFFICE O/P EST LOW 20 MIN: CPT | Performed by: INTERNAL MEDICINE

## 2025-05-27 NOTE — PROGRESS NOTES
"Chief Complaint  Obesity (Discuss weight loss medication )    Patient or patient representative verbalized consent for the use of Ambient Listening during the visit with  Ladan Ireland MD for chart documentation. 5/27/2025  12:42 EDT    Subjective        Sherri Espinoza presents to [facility]    History of Present Illness  The patient is a female who presents to discuss weight loss and potential medications to assist with her weight loss efforts during menopause.    She has been proactive in her weight loss journey, maintaining food journals and reducing her caloric intake. She has also increased her physical activity and abstained from consuming sodas, opting for water instead. Despite these efforts, she has not observed significant weight loss. She does not have any history of hypertension, hypercholesterolemia, or blood glucose irregularities.    She has done her research and understands that Zepbound or Wegovy are both indicated for BMI is greater than 28.  Her BMI is 29 she has failed normal weight loss efforts such as diet exercise dietary changes restrictions and calories etc.  With menopause she has had more difficulty losing weight she has no past medical history of pancreatitis nor thyroid cancer or medullary thyroid cancer she understands that there may be an out of pocket expense with these medications and has found a coupon online with the [employer] card      Objective   Vital Signs:  /70   Pulse 74   Temp 97.5 °F (36.4 °C) (Temporal)   Ht 165.1 cm (65\")   Wt 78.9 kg (174 lb)   SpO2 98%   BMI 28.96 kg/m²   Estimated body mass index is 28.96 kg/m² as calculated from the following:    Height as of this encounter: 165.1 cm (65\").    Weight as of this encounter: 78.9 kg (174 lb).            Physical Exam  Vitals and nursing note reviewed.   Constitutional:       Appearance: Normal appearance. She is well-developed.   HENT:      Head: Normocephalic and atraumatic. "      Right Ear: External ear normal.      Left Ear: External ear normal.   Eyes:      Extraocular Movements: Extraocular movements intact.      Conjunctiva/sclera: Conjunctivae normal.   Neck:      Vascular: No carotid bruit.   Cardiovascular:      Rate and Rhythm: Normal rate and regular rhythm.      Heart sounds: Normal heart sounds.      Comments: No bruits  Pulmonary:      Effort: Pulmonary effort is normal. No respiratory distress.      Breath sounds: Normal breath sounds. No stridor. No wheezing, rhonchi or rales.   Chest:      Chest wall: No tenderness.   Abdominal:      General: Bowel sounds are normal. There is no distension.      Palpations: Abdomen is soft. There is no mass.      Tenderness: There is no abdominal tenderness. There is no guarding or rebound.      Hernia: No hernia is present.   Musculoskeletal:      Cervical back: Neck supple.   Lymphadenopathy:      Cervical: No cervical adenopathy.   Skin:     General: Skin is warm.   Neurological:      Mental Status: She is alert and oriented to person, place, and time. Mental status is at baseline.   Psychiatric:         Mood and Affect: Mood normal.         Behavior: Behavior normal.         Thought Content: Thought content normal.         Judgment: Judgment normal.        Result Review :                   Assessment and Plan   There are no diagnoses linked to this encounter.         Assessment & Plan  1. Weight management.  Her Body Mass Index (BMI) is currently at 29, which barely meets the criteria for weight loss medication.  She has been informed about the potential benefits and risks associated with various weight loss medications, including orlistat, Wegovy, and Zepbound.  The potential side effects of Zepbound, including gastrointestinal issues such as nausea, vomiting, bloating, and constipation, were discussed. She was advised to monitor her diet closely, particularly her fat intake, and to consume smaller portions of food.  A prescription  for Zepbound 2.5 mg weekly for a month has been provided. She is instructed to communicate her progress around the third week of treatment. If she tolerates the medication well, the dosage may be increased to 5 mg after a month. She was also advised to consider the use of stool softeners, magnesium at night, or MiraLAX if she experiences constipation.    Follow-up  The patient will follow up in 4 to 6 months.         Follow Up   No follow-ups on file.  Patient was given instructions and counseling regarding her condition or for health maintenance advice. Please see specific information pulled into the AVS if appropriate.           Ladan Ireland MD   12:42 EDT   [unfilled]       Answers submitted by the patient for this visit:  Weight Management (Submitted on 5/22/2025)  Chief Complaint: Weight Management  Weight: unchanged  Weight loss treatment: low calorie, low carb diet, portion control, increasing exercise, barbara-based approach  Eating habit changes: Eating smaller portions  Energy level: decreased  Physical activity tolerance: stable

## 2025-05-28 DIAGNOSIS — M25.562 ACUTE PAIN OF LEFT KNEE: ICD-10-CM

## 2025-05-28 DIAGNOSIS — Z76.89 ENCOUNTER FOR WEIGHT MANAGEMENT: ICD-10-CM

## 2025-05-28 DIAGNOSIS — M25.462 KNEE EFFUSION, LEFT: ICD-10-CM

## 2025-05-28 RX ORDER — TIRZEPATIDE 2.5 MG/.5ML
2.5 INJECTION, SOLUTION SUBCUTANEOUS WEEKLY
Qty: 2 ML | Refills: 0 | Status: SHIPPED | OUTPATIENT
Start: 2025-05-28

## 2025-06-16 DIAGNOSIS — M25.462 KNEE EFFUSION, LEFT: ICD-10-CM

## 2025-06-16 DIAGNOSIS — M25.562 ACUTE PAIN OF LEFT KNEE: ICD-10-CM

## 2025-06-16 RX ORDER — TIRZEPATIDE 5 MG/.5ML
5 INJECTION, SOLUTION SUBCUTANEOUS WEEKLY
Qty: 2 ML | Refills: 0 | Status: SHIPPED | OUTPATIENT
Start: 2025-06-16

## 2025-07-28 DIAGNOSIS — M25.462 KNEE EFFUSION, LEFT: ICD-10-CM

## 2025-07-28 DIAGNOSIS — M25.562 ACUTE PAIN OF LEFT KNEE: ICD-10-CM

## 2025-07-28 RX ORDER — TIRZEPATIDE 5 MG/.5ML
5 INJECTION, SOLUTION SUBCUTANEOUS WEEKLY
Qty: 2 ML | Refills: 3 | Status: SHIPPED | OUTPATIENT
Start: 2025-07-28